# Patient Record
Sex: FEMALE | Race: WHITE | NOT HISPANIC OR LATINO | Employment: FULL TIME | URBAN - METROPOLITAN AREA
[De-identification: names, ages, dates, MRNs, and addresses within clinical notes are randomized per-mention and may not be internally consistent; named-entity substitution may affect disease eponyms.]

---

## 2021-11-22 ENCOUNTER — APPOINTMENT (OUTPATIENT)
Dept: GENERAL RADIOLOGY | Facility: HOSPITAL | Age: 26
End: 2021-11-22

## 2021-11-22 PROCEDURE — 73630 X-RAY EXAM OF FOOT: CPT | Performed by: PHYSICIAN ASSISTANT

## 2022-02-02 ENCOUNTER — INITIAL PRENATAL (OUTPATIENT)
Dept: OBSTETRICS AND GYNECOLOGY | Facility: CLINIC | Age: 27
End: 2022-02-02

## 2022-02-02 VITALS — DIASTOLIC BLOOD PRESSURE: 73 MMHG | WEIGHT: 169 LBS | SYSTOLIC BLOOD PRESSURE: 107 MMHG

## 2022-02-02 DIAGNOSIS — Z34.90 EARLY STAGE OF PREGNANCY: Primary | ICD-10-CM

## 2022-02-02 LAB
GLUCOSE UR STRIP-MCNC: NEGATIVE MG/DL
PROT UR STRIP-MCNC: NEGATIVE MG/DL

## 2022-02-02 PROCEDURE — 0501F PRENATAL FLOW SHEET: CPT | Performed by: OBSTETRICS & GYNECOLOGY

## 2022-02-02 RX ORDER — PRENATAL VIT/IRON FUM/FOLIC AC 27MG-0.8MG
TABLET ORAL DAILY
COMMUNITY
End: 2023-03-29

## 2022-02-02 NOTE — PROGRESS NOTES
Chief Complaint   Patient presents with   • Initial Prenatal Visit     HPI- Pt is 26 y.o.  at 8w0d here for prenatal visit.  Patient presents for initial OB visit.  She states she is sure regarding her LMP.  Her and her  were trying to conceive.  She has no major complaints today.  She is taking a prenatal vitamin.  She has no major underlying medical problems.  She does have a first-degree cousin with mosaicism Down syndrome.    ROS-     - No vaginal bleeding    GI- No abdominal pain    /73   Wt 76.7 kg (169 lb)   LMP 2021   Exam - See flow sheet    Fetal heart rate is normal    Assessment-  Diagnoses and all orders for this visit:    Early stage of pregnancy  -     OB Panel With HIV  -     Urine Culture - Urine, Urine, Clean Catch  -     IGP,CtNgTv,rfx Aptima HPV ASCU  -     US Ob Transvaginal; Future    Other orders  -     Prenatal Vit-Fe Fumarate-FA (prenatal vitamin 27-0.8) 27-0.8 MG tablet tablet; Take  by mouth Daily.  -     POC Urinalysis Dipstick    Initial OB counseling was done.  Discussed delivering hospital, weight gain, and diet in pregnancy.  OB labs and ultrasound have been ordered.  Discussed genetic screening options in pregnancy.  She will follow-up in 3 weeks.

## 2022-02-03 LAB
ABO GROUP BLD: NORMAL
BASOPHILS # BLD AUTO: 0.1 X10E3/UL (ref 0–0.2)
BASOPHILS NFR BLD AUTO: 1 %
BLD GP AB SCN SERPL QL: NEGATIVE
EOSINOPHIL # BLD AUTO: 0.3 X10E3/UL (ref 0–0.4)
EOSINOPHIL NFR BLD AUTO: 4 %
ERYTHROCYTE [DISTWIDTH] IN BLOOD BY AUTOMATED COUNT: 12.1 % (ref 11.7–15.4)
HBV SURFACE AG SERPL QL IA: NEGATIVE
HCT VFR BLD AUTO: 39.8 % (ref 34–46.6)
HCV AB S/CO SERPL IA: <0.1 S/CO RATIO (ref 0–0.9)
HGB BLD-MCNC: 13.2 G/DL (ref 11.1–15.9)
HIV 1+2 AB+HIV1 P24 AG SERPL QL IA: NON REACTIVE
IMM GRANULOCYTES # BLD AUTO: 0 X10E3/UL (ref 0–0.1)
IMM GRANULOCYTES NFR BLD AUTO: 0 %
LYMPHOCYTES # BLD AUTO: 2.5 X10E3/UL (ref 0.7–3.1)
LYMPHOCYTES NFR BLD AUTO: 38 %
MCH RBC QN AUTO: 29.2 PG (ref 26.6–33)
MCHC RBC AUTO-ENTMCNC: 33.2 G/DL (ref 31.5–35.7)
MCV RBC AUTO: 88 FL (ref 79–97)
MONOCYTES # BLD AUTO: 0.5 X10E3/UL (ref 0.1–0.9)
MONOCYTES NFR BLD AUTO: 7 %
NEUTROPHILS # BLD AUTO: 3.2 X10E3/UL (ref 1.4–7)
NEUTROPHILS NFR BLD AUTO: 50 %
PLATELET # BLD AUTO: 268 X10E3/UL (ref 150–450)
RBC # BLD AUTO: 4.52 X10E6/UL (ref 3.77–5.28)
RH BLD: POSITIVE
RPR SER QL: NON REACTIVE
RUBV IGG SERPL IA-ACNC: >33 INDEX
WBC # BLD AUTO: 6.6 X10E3/UL (ref 3.4–10.8)

## 2022-02-04 LAB
BACTERIA UR CULT: NO GROWTH
BACTERIA UR CULT: NORMAL

## 2022-02-07 LAB
C TRACH RRNA CVX QL NAA+PROBE: NEGATIVE
CONV .: NORMAL
CYTOLOGIST CVX/VAG CYTO: NORMAL
CYTOLOGY CVX/VAG DOC CYTO: NORMAL
CYTOLOGY CVX/VAG DOC THIN PREP: NORMAL
DX ICD CODE: NORMAL
HIV 1 & 2 AB SER-IMP: NORMAL
N GONORRHOEA RRNA CVX QL NAA+PROBE: NEGATIVE
OTHER STN SPEC: NORMAL
STAT OF ADQ CVX/VAG CYTO-IMP: NORMAL
T VAGINALIS RRNA SPEC QL NAA+PROBE: NEGATIVE

## 2022-02-25 ENCOUNTER — ROUTINE PRENATAL (OUTPATIENT)
Dept: OBSTETRICS AND GYNECOLOGY | Facility: CLINIC | Age: 27
End: 2022-02-25

## 2022-02-25 VITALS — WEIGHT: 172.8 LBS | DIASTOLIC BLOOD PRESSURE: 73 MMHG | SYSTOLIC BLOOD PRESSURE: 106 MMHG

## 2022-02-25 DIAGNOSIS — Z82.79 FAMILY HISTORY OF DOWN SYNDROME: ICD-10-CM

## 2022-02-25 DIAGNOSIS — Z34.01 ENCOUNTER FOR SUPERVISION OF NORMAL FIRST PREGNANCY IN FIRST TRIMESTER: Primary | ICD-10-CM

## 2022-02-25 LAB
GLUCOSE UR STRIP-MCNC: NEGATIVE MG/DL
PROT UR STRIP-MCNC: NEGATIVE MG/DL

## 2022-02-25 PROCEDURE — 0502F SUBSEQUENT PRENATAL CARE: CPT | Performed by: OBSTETRICS & GYNECOLOGY

## 2022-02-25 NOTE — PROGRESS NOTES
CC:  Pregnancy  She is doing well with no major issues.  Reviewed prenatal ultrasound and labs.  Patient does desire cell free DNA testing.  A/P: Supervision of pregnancy at 11 weeks  --Follow-up in 4 weeks

## 2022-03-10 LAB
CFDNA.FET/CFDNA.TOTAL SFR FETUS: NORMAL %
CITATION REF LAB TEST: NORMAL
FET 13+18+21+X+Y ANEUP PLAS.CFDNA: NEGATIVE
FET CHR 21 TS PLAS.CFDNA QL: NEGATIVE
FET SEX PLAS.CFDNA DOSAGE CFDNA: NORMAL
FET TS 13 RISK PLAS.CFDNA QL: NEGATIVE
FET TS 18 RISK WBC.DNA+CFDNA QL: NEGATIVE
GA EST FROM CONCEPTION DATE: NORMAL D
GESTATIONAL AGE > 9:: YES
LAB DIRECTOR NAME PROVIDER: NORMAL
LAB DIRECTOR NAME PROVIDER: NORMAL
LABORATORY COMMENT REPORT: NORMAL
LIMITATIONS OF THE TEST: NORMAL
NEGATIVE PREDICTIVE VALUE: NORMAL
NOTE: NORMAL
PERFORMANCE CHARACTERISTICS: NORMAL
POSITIVE PREDICTIVE VALUE: NORMAL
REF LAB TEST METHOD: NORMAL
TEST PERFORMANCE INFO SPEC: NORMAL

## 2022-03-11 ENCOUNTER — TELEPHONE (OUTPATIENT)
Dept: OBSTETRICS AND GYNECOLOGY | Facility: CLINIC | Age: 27
End: 2022-03-11

## 2022-03-11 NOTE — TELEPHONE ENCOUNTER
----- Message from Angelika Dick MD sent at 3/11/2022  8:54 AM EST -----  Please let patient know that her genetic testing was normal, and if she wants to know gender, it showed a male fetus

## 2022-03-25 ENCOUNTER — ROUTINE PRENATAL (OUTPATIENT)
Dept: OBSTETRICS AND GYNECOLOGY | Facility: CLINIC | Age: 27
End: 2022-03-25

## 2022-03-25 VITALS — WEIGHT: 171.8 LBS

## 2022-03-25 DIAGNOSIS — Z36.89 ENCOUNTER FOR FETAL ANATOMIC SURVEY: ICD-10-CM

## 2022-03-25 DIAGNOSIS — Z34.02 ENCOUNTER FOR SUPERVISION OF NORMAL FIRST PREGNANCY IN SECOND TRIMESTER: Primary | ICD-10-CM

## 2022-03-25 LAB
GLUCOSE UR STRIP-MCNC: NEGATIVE MG/DL
PROT UR STRIP-MCNC: NEGATIVE MG/DL

## 2022-03-25 PROCEDURE — 0502F SUBSEQUENT PRENATAL CARE: CPT | Performed by: OBSTETRICS & GYNECOLOGY

## 2022-03-25 NOTE — PROGRESS NOTES
CC:  Pregnancy  Patient doing well with no complaints.  She had normal cell free DNA testing.  Offered screening for neural tube defects and she declines.  She will followup in 4 weeks with anatomy u/s.  A/P:  Supervision of normal pregnancy at 15 weeks  --Followup in 4 weeks

## 2022-04-20 ENCOUNTER — ROUTINE PRENATAL (OUTPATIENT)
Dept: OBSTETRICS AND GYNECOLOGY | Facility: CLINIC | Age: 27
End: 2022-04-20

## 2022-04-20 VITALS — DIASTOLIC BLOOD PRESSURE: 77 MMHG | SYSTOLIC BLOOD PRESSURE: 116 MMHG | WEIGHT: 177.2 LBS

## 2022-04-20 DIAGNOSIS — Z34.02 ENCOUNTER FOR SUPERVISION OF NORMAL FIRST PREGNANCY IN SECOND TRIMESTER: Primary | ICD-10-CM

## 2022-04-20 LAB
GLUCOSE UR STRIP-MCNC: NEGATIVE MG/DL
PROT UR STRIP-MCNC: NEGATIVE MG/DL

## 2022-04-20 PROCEDURE — 0502F SUBSEQUENT PRENATAL CARE: CPT | Performed by: OBSTETRICS & GYNECOLOGY

## 2022-04-20 NOTE — PROGRESS NOTES
CC:  Pregnancy  She is doing well and has no complaints.  She has felt some fetal movement.  Anatomy ultrasound was performed today and shows normal-appearing fetal anatomy.  Ultrasound was reviewed with her.  A/P: Supervision of normal pregnancy at 19 weeks  -- Follow-up in 4 weeks

## 2022-05-18 ENCOUNTER — ROUTINE PRENATAL (OUTPATIENT)
Dept: OBSTETRICS AND GYNECOLOGY | Facility: CLINIC | Age: 27
End: 2022-05-18

## 2022-05-18 VITALS — WEIGHT: 180 LBS | SYSTOLIC BLOOD PRESSURE: 107 MMHG | DIASTOLIC BLOOD PRESSURE: 73 MMHG

## 2022-05-18 DIAGNOSIS — Z13.0 SCREENING FOR IRON DEFICIENCY ANEMIA: ICD-10-CM

## 2022-05-18 DIAGNOSIS — Z13.1 SCREENING FOR DIABETES MELLITUS: ICD-10-CM

## 2022-05-18 DIAGNOSIS — Z34.02 ENCOUNTER FOR SUPERVISION OF NORMAL FIRST PREGNANCY IN SECOND TRIMESTER: Primary | ICD-10-CM

## 2022-05-18 LAB
GLUCOSE UR STRIP-MCNC: NEGATIVE MG/DL
PROT UR STRIP-MCNC: NEGATIVE MG/DL

## 2022-05-18 PROCEDURE — 0502F SUBSEQUENT PRENATAL CARE: CPT | Performed by: OBSTETRICS & GYNECOLOGY

## 2022-05-18 NOTE — PROGRESS NOTES
CC:  Pregnancy  She is doing well and has no complaints.  She is feeling fetal movement.  Discussed 1 hour glucose test and CBC at her next visit.  A/P: Supervision of normal pregnancy at 23 weeks  -- Follow-up in 2 weeks

## 2022-06-15 ENCOUNTER — ROUTINE PRENATAL (OUTPATIENT)
Dept: OBSTETRICS AND GYNECOLOGY | Facility: CLINIC | Age: 27
End: 2022-06-15

## 2022-06-15 VITALS — DIASTOLIC BLOOD PRESSURE: 72 MMHG | WEIGHT: 185.8 LBS | SYSTOLIC BLOOD PRESSURE: 109 MMHG

## 2022-06-15 DIAGNOSIS — Z34.02 ENCOUNTER FOR SUPERVISION OF NORMAL FIRST PREGNANCY IN SECOND TRIMESTER: Primary | ICD-10-CM

## 2022-06-15 LAB
GLUCOSE UR STRIP-MCNC: NEGATIVE MG/DL
PROT UR STRIP-MCNC: NEGATIVE MG/DL

## 2022-06-15 PROCEDURE — 0502F SUBSEQUENT PRENATAL CARE: CPT | Performed by: OBSTETRICS & GYNECOLOGY

## 2022-06-15 NOTE — PROGRESS NOTES
CC:  Pregnancy  Patient has no complaints today.  She is doing well.  Discussed fetal kick counts in the last trimester.  She is doing her 1 hour glucose test today.  A/P: Supervision of normal pregnancy at 27 weeks  --Follow-up in 2 weeks

## 2022-06-16 LAB
ERYTHROCYTE [DISTWIDTH] IN BLOOD BY AUTOMATED COUNT: 12.3 % (ref 11.7–15.4)
GLUCOSE 1H P 50 G GLC PO SERPL-MCNC: 131 MG/DL (ref 65–139)
HCT VFR BLD AUTO: 38.4 % (ref 34–46.6)
HGB BLD-MCNC: 12.6 G/DL (ref 11.1–15.9)
MCH RBC QN AUTO: 29.6 PG (ref 26.6–33)
MCHC RBC AUTO-ENTMCNC: 32.8 G/DL (ref 31.5–35.7)
MCV RBC AUTO: 90 FL (ref 79–97)
PLATELET # BLD AUTO: 209 X10E3/UL (ref 150–450)
RBC # BLD AUTO: 4.25 X10E6/UL (ref 3.77–5.28)
WBC # BLD AUTO: 11.9 X10E3/UL (ref 3.4–10.8)

## 2022-07-01 ENCOUNTER — ROUTINE PRENATAL (OUTPATIENT)
Dept: OBSTETRICS AND GYNECOLOGY | Facility: CLINIC | Age: 27
End: 2022-07-01

## 2022-07-01 VITALS — SYSTOLIC BLOOD PRESSURE: 127 MMHG | DIASTOLIC BLOOD PRESSURE: 82 MMHG | WEIGHT: 189 LBS

## 2022-07-01 DIAGNOSIS — Z3A.29 29 WEEKS GESTATION OF PREGNANCY: ICD-10-CM

## 2022-07-01 DIAGNOSIS — Z23 NEED FOR TDAP VACCINATION: Primary | ICD-10-CM

## 2022-07-01 DIAGNOSIS — O99.210 MATERNAL OBESITY AFFECTING PREGNANCY, ANTEPARTUM: ICD-10-CM

## 2022-07-01 LAB
GLUCOSE UR STRIP-MCNC: NEGATIVE MG/DL
PROT UR STRIP-MCNC: NEGATIVE MG/DL

## 2022-07-01 PROCEDURE — 0502F SUBSEQUENT PRENATAL CARE: CPT | Performed by: OBSTETRICS & GYNECOLOGY

## 2022-07-01 PROCEDURE — 90471 IMMUNIZATION ADMIN: CPT | Performed by: OBSTETRICS & GYNECOLOGY

## 2022-07-01 PROCEDURE — 90715 TDAP VACCINE 7 YRS/> IM: CPT | Performed by: OBSTETRICS & GYNECOLOGY

## 2022-07-01 NOTE — PROGRESS NOTES
CC:  Pregnancy  She is doing well and has no major complaints.  She reports good fetal movement.  Recommend Tdap and she will receive the Tdap shot today.  We will check a growth ultrasound with her visit in 2 weeks.  A/P: Supervision of pregnancy at 29 weeks  -- Follow-up in 2 weeks

## 2022-07-13 ENCOUNTER — HOSPITAL ENCOUNTER (OUTPATIENT)
Dept: CARDIOLOGY | Facility: HOSPITAL | Age: 27
Discharge: HOME | End: 2022-07-13
Admitting: OBSTETRICS & GYNECOLOGY

## 2022-07-13 ENCOUNTER — ROUTINE PRENATAL (OUTPATIENT)
Dept: OBSTETRICS AND GYNECOLOGY | Facility: CLINIC | Age: 27
End: 2022-07-13

## 2022-07-13 VITALS — SYSTOLIC BLOOD PRESSURE: 118 MMHG | WEIGHT: 189 LBS | DIASTOLIC BLOOD PRESSURE: 75 MMHG

## 2022-07-13 DIAGNOSIS — O12.03 SWELLING OF LOWER EXTREMITY DURING PREGNANCY IN THIRD TRIMESTER: ICD-10-CM

## 2022-07-13 DIAGNOSIS — Z34.03 ENCOUNTER FOR SUPERVISION OF NORMAL FIRST PREGNANCY IN THIRD TRIMESTER: Primary | ICD-10-CM

## 2022-07-13 LAB
BH CV LOWER VASCULAR LEFT COMMON FEMORAL AUGMENT: NORMAL
BH CV LOWER VASCULAR LEFT COMMON FEMORAL COMPETENT: NORMAL
BH CV LOWER VASCULAR LEFT COMMON FEMORAL COMPRESS: NORMAL
BH CV LOWER VASCULAR LEFT COMMON FEMORAL PHASIC: NORMAL
BH CV LOWER VASCULAR LEFT COMMON FEMORAL SPONT: NORMAL
BH CV LOWER VASCULAR LEFT DISTAL FEMORAL COMPRESS: NORMAL
BH CV LOWER VASCULAR LEFT GASTRONEMIUS COMPRESS: NORMAL
BH CV LOWER VASCULAR LEFT GREATER SAPH AK COMPRESS: NORMAL
BH CV LOWER VASCULAR LEFT GREATER SAPH BK COMPRESS: NORMAL
BH CV LOWER VASCULAR LEFT LESSER SAPH COMPRESS: NORMAL
BH CV LOWER VASCULAR LEFT MID FEMORAL AUGMENT: NORMAL
BH CV LOWER VASCULAR LEFT MID FEMORAL COMPETENT: NORMAL
BH CV LOWER VASCULAR LEFT MID FEMORAL COMPRESS: NORMAL
BH CV LOWER VASCULAR LEFT MID FEMORAL PHASIC: NORMAL
BH CV LOWER VASCULAR LEFT MID FEMORAL SPONT: NORMAL
BH CV LOWER VASCULAR LEFT PERONEAL COMPRESS: NORMAL
BH CV LOWER VASCULAR LEFT POPLITEAL AUGMENT: NORMAL
BH CV LOWER VASCULAR LEFT POPLITEAL COMPETENT: NORMAL
BH CV LOWER VASCULAR LEFT POPLITEAL COMPRESS: NORMAL
BH CV LOWER VASCULAR LEFT POPLITEAL PHASIC: NORMAL
BH CV LOWER VASCULAR LEFT POPLITEAL SPONT: NORMAL
BH CV LOWER VASCULAR LEFT POSTERIOR TIBIAL COMPRESS: NORMAL
BH CV LOWER VASCULAR LEFT PROFUNDA FEMORAL COMPRESS: NORMAL
BH CV LOWER VASCULAR LEFT PROXIMAL FEMORAL COMPRESS: NORMAL
BH CV LOWER VASCULAR LEFT SAPHENOFEMORAL JUNCTION COMPRESS: NORMAL
BH CV LOWER VASCULAR RIGHT COMMON FEMORAL AUGMENT: NORMAL
BH CV LOWER VASCULAR RIGHT COMMON FEMORAL COMPETENT: NORMAL
BH CV LOWER VASCULAR RIGHT COMMON FEMORAL COMPRESS: NORMAL
BH CV LOWER VASCULAR RIGHT COMMON FEMORAL PHASIC: NORMAL
BH CV LOWER VASCULAR RIGHT COMMON FEMORAL SPONT: NORMAL
GLUCOSE UR STRIP-MCNC: NEGATIVE MG/DL
MAXIMAL PREDICTED HEART RATE: 192 BPM
PROT UR STRIP-MCNC: NEGATIVE MG/DL
STRESS TARGET HR: 163 BPM

## 2022-07-13 PROCEDURE — 0502F SUBSEQUENT PRENATAL CARE: CPT | Performed by: OBSTETRICS & GYNECOLOGY

## 2022-07-13 PROCEDURE — 93971 EXTREMITY STUDY: CPT

## 2022-07-13 NOTE — PROGRESS NOTES
Today's left lower venous doppler preliminary report is negative for deep vein thrombosis. This preliminary report was given to Dr. Angelika Dick. I was instructed to tell the patient the preliminary results and she can leave. Patient understands.

## 2022-07-13 NOTE — PROGRESS NOTES
CC:  Pregnancy  She complains today of an area on her left lower extremity on her shin that appeared as a bruise a couple of weeks ago and has slightly lightened in color.  There is a darkened area on the left shin that is slightly tender.  There is no overlying warmth and no significant surrounding edema.  I discussed with patient that I do recommend lower extremity Doppler to rule out DVT, but finding is most likely a small varicose vein.  Order was placed.  A growth ultrasound was performed today and showed appropriate fetal growth and ultrasound was reviewed with her.  A/P: Supervision of pregnancy at 31 weeks with left lower extremity swelling  -- Left lower extremity Doppler ordered  -- Follow-up with me in 2 weeks

## 2022-07-27 ENCOUNTER — ROUTINE PRENATAL (OUTPATIENT)
Dept: OBSTETRICS AND GYNECOLOGY | Facility: CLINIC | Age: 27
End: 2022-07-27

## 2022-07-27 VITALS — SYSTOLIC BLOOD PRESSURE: 116 MMHG | DIASTOLIC BLOOD PRESSURE: 75 MMHG | WEIGHT: 191.4 LBS

## 2022-07-27 DIAGNOSIS — Z34.03 ENCOUNTER FOR SUPERVISION OF NORMAL FIRST PREGNANCY IN THIRD TRIMESTER: Primary | ICD-10-CM

## 2022-07-27 PROCEDURE — 0502F SUBSEQUENT PRENATAL CARE: CPT | Performed by: OBSTETRICS & GYNECOLOGY

## 2022-07-27 NOTE — PROGRESS NOTES
CC: Pregnancy  She is doing well and has no major complaints.  She reports good fetal movement.  She was ruled out last week for DVT with lower extremity Doppler and denies any changes to the area.  A/P: Supervision of pregnancy at 33 weeks  -- Follow-up in 2 weeks

## 2022-08-10 ENCOUNTER — ROUTINE PRENATAL (OUTPATIENT)
Dept: OBSTETRICS AND GYNECOLOGY | Facility: CLINIC | Age: 27
End: 2022-08-10

## 2022-08-10 VITALS — SYSTOLIC BLOOD PRESSURE: 121 MMHG | WEIGHT: 194 LBS | DIASTOLIC BLOOD PRESSURE: 75 MMHG

## 2022-08-10 DIAGNOSIS — Z34.03 ENCOUNTER FOR SUPERVISION OF NORMAL FIRST PREGNANCY IN THIRD TRIMESTER: Primary | ICD-10-CM

## 2022-08-10 LAB
GLUCOSE UR STRIP-MCNC: NEGATIVE MG/DL
PROT UR STRIP-MCNC: NEGATIVE MG/DL

## 2022-08-10 PROCEDURE — 0502F SUBSEQUENT PRENATAL CARE: CPT | Performed by: OBSTETRICS & GYNECOLOGY

## 2022-08-10 NOTE — PROGRESS NOTES
CC:  Pregnancy  She continues to do well with no major issues.  She reports good fetal movement.  She is starting to become more uncomfortable.  Discussed cervical check and GBS culture at her next visit.  A/P: Supervision of normal pregnancy at 35 weeks  -- Follow-up in 1 week

## 2022-08-17 ENCOUNTER — ROUTINE PRENATAL (OUTPATIENT)
Dept: OBSTETRICS AND GYNECOLOGY | Facility: CLINIC | Age: 27
End: 2022-08-17

## 2022-08-17 VITALS — WEIGHT: 195.6 LBS | SYSTOLIC BLOOD PRESSURE: 115 MMHG | DIASTOLIC BLOOD PRESSURE: 76 MMHG

## 2022-08-17 DIAGNOSIS — Z36.85 ANTENATAL SCREENING FOR STREPTOCOCCUS B: ICD-10-CM

## 2022-08-17 DIAGNOSIS — Z34.03 ENCOUNTER FOR SUPERVISION OF NORMAL FIRST PREGNANCY IN THIRD TRIMESTER: Primary | ICD-10-CM

## 2022-08-17 LAB
GLUCOSE UR STRIP-MCNC: NEGATIVE MG/DL
PROT UR STRIP-MCNC: NEGATIVE MG/DL

## 2022-08-17 PROCEDURE — 0502F SUBSEQUENT PRENATAL CARE: CPT | Performed by: OBSTETRICS & GYNECOLOGY

## 2022-08-17 NOTE — PROGRESS NOTES
CC:  Pregnancy  She is doing well and has no major complaints.  She does report a history of rash with penicillins and we will plan on Ancef if GBS culture is positive.  She reports good fetal movement.  Labor precautions were discussed.  A/P: Supervision of normal pregnancy at 36 weeks  -- Follow-up weekly

## 2022-08-19 ENCOUNTER — TELEPHONE (OUTPATIENT)
Dept: OBSTETRICS AND GYNECOLOGY | Facility: CLINIC | Age: 27
End: 2022-08-19

## 2022-08-19 NOTE — TELEPHONE ENCOUNTER
Caller: JEANETTE    Relationship:     Best call back number: [phone]    Prescribing Provider: DR. NOEL    REP JEANETTE CALLED FROM [facility] TO CONFIRM IF DR. NOEL WILL BE THE SIGNING PHYSICIAN FOR BREAST PUMP ORDERED FOR PATIENT GAVIN LINDSEY.    JEANETTE WILL FAX ORDER [phone]    JEANETTE CALL BACK NUMBER [phone]

## 2022-08-21 LAB — B-HEM STREP SPEC QL CULT: NEGATIVE

## 2022-08-23 NOTE — TELEPHONE ENCOUNTER
Called [facility] regarding breast pump and they said it has shipped out. They received the fas last week and the device is now shipped.

## 2022-08-24 ENCOUNTER — ROUTINE PRENATAL (OUTPATIENT)
Dept: OBSTETRICS AND GYNECOLOGY | Facility: CLINIC | Age: 27
End: 2022-08-24

## 2022-08-24 VITALS — DIASTOLIC BLOOD PRESSURE: 76 MMHG | SYSTOLIC BLOOD PRESSURE: 116 MMHG | WEIGHT: 194 LBS

## 2022-08-24 DIAGNOSIS — O16.3 ELEVATED BLOOD PRESSURE AFFECTING PREGNANCY IN THIRD TRIMESTER, ANTEPARTUM: Primary | ICD-10-CM

## 2022-08-24 DIAGNOSIS — Z3A.37 37 WEEKS GESTATION OF PREGNANCY: ICD-10-CM

## 2022-08-24 LAB
GLUCOSE UR STRIP-MCNC: NEGATIVE MG/DL
PROT UR STRIP-MCNC: NEGATIVE MG/DL

## 2022-08-24 PROCEDURE — 0502F SUBSEQUENT PRENATAL CARE: CPT | Performed by: OBSTETRICS & GYNECOLOGY

## 2022-08-24 NOTE — PROGRESS NOTES
CC:  Pregnancy  Patient reports that she has taken her blood pressure several times at work with a wrist cuff and her blood pressures have been mildly elevated.  She also reports episodes of her heart racing that she notices on her apple watch.  She is able to exercise and states that she walked on the treadmill for 45 minutes this morning.  She has been off work today and states she is not had any episodes of feeling like her heart is racing.  She is always had normal blood pressures in our office and there is no protein in her urine today.  I offered to send her to labor and delivery for serial blood pressures and labs, but she declines.  We will check labs in our office today and she has been given a prescription for a regular blood pressure cuff.  I have advised her to call if her blood pressures are elevated with a normal blood pressure cuff.  A/P: Supervision of pregnancy at 37 weeks with patient reported hypertension, but normal blood pressures in the office  -- Follow-up in 1 week  -- Preeclampsia labs ordered

## 2022-08-26 LAB
ALBUMIN SERPL-MCNC: 3.5 G/DL (ref 3.9–5)
ALBUMIN/GLOB SERPL: 1.5 {RATIO} (ref 1.2–2.2)
ALP SERPL-CCNC: 154 IU/L (ref 44–121)
ALT SERPL-CCNC: 16 IU/L (ref 0–32)
AST SERPL-CCNC: 26 IU/L (ref 0–40)
BILIRUB SERPL-MCNC: 0.3 MG/DL (ref 0–1.2)
BUN SERPL-MCNC: 11 MG/DL (ref 6–20)
BUN/CREAT SERPL: 17 (ref 9–23)
CALCIUM SERPL-MCNC: 8.5 MG/DL (ref 8.7–10.2)
CHLORIDE SERPL-SCNC: 106 MMOL/L (ref 96–106)
CO2 SERPL-SCNC: 16 MMOL/L (ref 20–29)
CREAT SERPL-MCNC: 0.64 MG/DL (ref 0.57–1)
CREAT UR-MCNC: 84.1 MG/DL
EGFRCR-CYS SERPLBLD CKD-EPI 2021: 123 ML/MIN/1.73
ERYTHROCYTE [DISTWIDTH] IN BLOOD BY AUTOMATED COUNT: 12.3 % (ref 11.7–15.4)
GLOBULIN SER CALC-MCNC: 2.4 G/DL (ref 1.5–4.5)
GLUCOSE SERPL-MCNC: 77 MG/DL (ref 65–99)
HCT VFR BLD AUTO: 42.8 % (ref 34–46.6)
HGB BLD-MCNC: 13.8 G/DL (ref 11.1–15.9)
MCH RBC QN AUTO: 30.2 PG (ref 26.6–33)
MCHC RBC AUTO-ENTMCNC: 32.2 G/DL (ref 31.5–35.7)
MCV RBC AUTO: 94 FL (ref 79–97)
PLATELET # BLD AUTO: 182 X10E3/UL (ref 150–450)
POTASSIUM SERPL-SCNC: 4.1 MMOL/L (ref 3.5–5.2)
PROT SERPL-MCNC: 5.9 G/DL (ref 6–8.5)
PROT UR-MCNC: 9.7 MG/DL
PROT/CREAT UR: 115 MG/G CREAT (ref 0–200)
RBC # BLD AUTO: 4.57 X10E6/UL (ref 3.77–5.28)
SODIUM SERPL-SCNC: 137 MMOL/L (ref 134–144)
WBC # BLD AUTO: 11.7 X10E3/UL (ref 3.4–10.8)

## 2022-08-31 ENCOUNTER — ROUTINE PRENATAL (OUTPATIENT)
Dept: OBSTETRICS AND GYNECOLOGY | Facility: CLINIC | Age: 27
End: 2022-08-31

## 2022-08-31 VITALS — WEIGHT: 197 LBS | DIASTOLIC BLOOD PRESSURE: 83 MMHG | SYSTOLIC BLOOD PRESSURE: 115 MMHG

## 2022-08-31 DIAGNOSIS — Z34.03 ENCOUNTER FOR SUPERVISION OF NORMAL FIRST PREGNANCY IN THIRD TRIMESTER: Primary | ICD-10-CM

## 2022-08-31 PROCEDURE — 0502F SUBSEQUENT PRENATAL CARE: CPT | Performed by: OBSTETRICS & GYNECOLOGY

## 2022-08-31 NOTE — PROGRESS NOTES
CC:  Pregnancy  She is doing well and has no major complaints today.  She states her blood pressures have been normal after using an appropriate blood pressure cuff.  She reports good fetal movement.  She denies any contractions.  She was given the option of elective induction at 39 weeks, but at this time wishes for expectant management.  A/P: Supervision of normal pregnancy at 38 weeks  -- Follow-up in 1 week

## 2022-09-07 ENCOUNTER — ROUTINE PRENATAL (OUTPATIENT)
Dept: OBSTETRICS AND GYNECOLOGY | Facility: CLINIC | Age: 27
End: 2022-09-07

## 2022-09-07 VITALS — WEIGHT: 200.2 LBS | SYSTOLIC BLOOD PRESSURE: 113 MMHG | DIASTOLIC BLOOD PRESSURE: 71 MMHG

## 2022-09-07 DIAGNOSIS — O48.0 POST-TERM PREGNANCY, 40-42 WEEKS OF GESTATION: ICD-10-CM

## 2022-09-07 DIAGNOSIS — Z34.03 ENCOUNTER FOR SUPERVISION OF NORMAL FIRST PREGNANCY IN THIRD TRIMESTER: Primary | ICD-10-CM

## 2022-09-07 LAB
GLUCOSE UR STRIP-MCNC: NEGATIVE MG/DL
PROT UR STRIP-MCNC: NEGATIVE MG/DL

## 2022-09-07 PROCEDURE — 0502F SUBSEQUENT PRENATAL CARE: CPT | Performed by: OBSTETRICS & GYNECOLOGY

## 2022-09-07 NOTE — PROGRESS NOTES
CC:  Pregnancy  She is doing well and has no major complaints.  She reports good fetal movement.  She would like to continue with expectant management at this time and we will check an ultrasound next week with her appointments and she will be 40 weeks.  A/P: Supervision of normal pregnancy at 39 weeks  -- Follow-up in 1 week with growth ultrasound and BPP

## 2022-09-14 ENCOUNTER — ROUTINE PRENATAL (OUTPATIENT)
Dept: OBSTETRICS AND GYNECOLOGY | Facility: CLINIC | Age: 27
End: 2022-09-14

## 2022-09-14 VITALS — SYSTOLIC BLOOD PRESSURE: 123 MMHG | WEIGHT: 200 LBS | DIASTOLIC BLOOD PRESSURE: 80 MMHG

## 2022-09-14 DIAGNOSIS — Z3A.40 40 WEEKS GESTATION OF PREGNANCY: ICD-10-CM

## 2022-09-14 DIAGNOSIS — O48.0 POST-TERM PREGNANCY, 40-42 WEEKS OF GESTATION: Primary | ICD-10-CM

## 2022-09-14 LAB
GLUCOSE UR STRIP-MCNC: NEGATIVE MG/DL
PROT UR STRIP-MCNC: NEGATIVE MG/DL

## 2022-09-14 PROCEDURE — 0502F SUBSEQUENT PRENATAL CARE: CPT | Performed by: OBSTETRICS & GYNECOLOGY

## 2022-09-14 NOTE — PROGRESS NOTES
CC:  Pregnancy  She continues to do well and denies any contractions, leaking, or bleeding.  She reports good fetal movement.  Ultrasound today shows appropriate fetal growth and BPP is 8 out of 8.  She would like to continue with expectant management for 1 more week to see if labor will occur on its own.  She will see me back next Tuesday with another BPP and if undelivered, we will schedule her for induction next Friday.    A/P: Supervision of pregnancy at 40 weeks  -- Follow-up in 1 week with BPP

## 2022-09-16 ENCOUNTER — HOSPITAL ENCOUNTER (INPATIENT)
Facility: HOSPITAL | Age: 27
LOS: 2 days | Discharge: HOME | End: 2022-09-18
Attending: OBSTETRICS & GYNECOLOGY | Admitting: OBSTETRICS & GYNECOLOGY

## 2022-09-16 ENCOUNTER — ANESTHESIA (OUTPATIENT)
Dept: LABOR AND DELIVERY | Facility: HOSPITAL | Age: 27
End: 2022-09-16

## 2022-09-16 ENCOUNTER — ANESTHESIA EVENT (OUTPATIENT)
Dept: LABOR AND DELIVERY | Facility: HOSPITAL | Age: 27
End: 2022-09-16

## 2022-09-16 PROBLEM — Z34.90 PREGNANCY: Status: ACTIVE | Noted: 2022-09-16

## 2022-09-16 LAB
ABO GROUP BLD: NORMAL
BLD GP AB SCN SERPL QL: NEGATIVE
DEPRECATED RDW RBC AUTO: 41.2 FL (ref 37–54)
ERYTHROCYTE [DISTWIDTH] IN BLOOD BY AUTOMATED COUNT: 12.5 % (ref 12.3–15.4)
HCT VFR BLD AUTO: 42 % (ref 34–46.6)
HGB BLD-MCNC: 14 G/DL (ref 12–15.9)
MCH RBC QN AUTO: 30 PG (ref 26.6–33)
MCHC RBC AUTO-ENTMCNC: 33.3 G/DL (ref 31.5–35.7)
MCV RBC AUTO: 90.1 FL (ref 79–97)
PLATELET # BLD AUTO: 148 10*3/MM3 (ref 140–450)
PMV BLD AUTO: 11.8 FL (ref 6–12)
RBC # BLD AUTO: 4.66 10*6/MM3 (ref 3.77–5.28)
RH BLD: POSITIVE
T&S EXPIRATION DATE: NORMAL
WBC NRBC COR # BLD: 10.9 10*3/MM3 (ref 3.4–10.8)

## 2022-09-16 PROCEDURE — C1755 CATHETER, INTRASPINAL: HCPCS

## 2022-09-16 PROCEDURE — 59400 OBSTETRICAL CARE: CPT | Performed by: OBSTETRICS & GYNECOLOGY

## 2022-09-16 PROCEDURE — 99202 OFFICE O/P NEW SF 15 MIN: CPT | Performed by: OBSTETRICS & GYNECOLOGY

## 2022-09-16 PROCEDURE — 25010000002 ONDANSETRON PER 1 MG: Performed by: ANESTHESIOLOGY

## 2022-09-16 PROCEDURE — S0260 H&P FOR SURGERY: HCPCS | Performed by: OBSTETRICS & GYNECOLOGY

## 2022-09-16 PROCEDURE — 0KQM0ZZ REPAIR PERINEUM MUSCLE, OPEN APPROACH: ICD-10-PCS | Performed by: OBSTETRICS & GYNECOLOGY

## 2022-09-16 PROCEDURE — C1755 CATHETER, INTRASPINAL: HCPCS | Performed by: ANESTHESIOLOGY

## 2022-09-16 PROCEDURE — 25010000002 OXYTOCIN PER 10 UNITS: Performed by: OBSTETRICS & GYNECOLOGY

## 2022-09-16 PROCEDURE — 86900 BLOOD TYPING SEROLOGIC ABO: CPT | Performed by: OBSTETRICS & GYNECOLOGY

## 2022-09-16 PROCEDURE — 85027 COMPLETE CBC AUTOMATED: CPT | Performed by: OBSTETRICS & GYNECOLOGY

## 2022-09-16 PROCEDURE — 86901 BLOOD TYPING SEROLOGIC RH(D): CPT | Performed by: OBSTETRICS & GYNECOLOGY

## 2022-09-16 PROCEDURE — 86850 RBC ANTIBODY SCREEN: CPT | Performed by: OBSTETRICS & GYNECOLOGY

## 2022-09-16 RX ORDER — FENTANYL CIT 0.2 MG/100ML-ROPIV 0.2%-NACL 0.9% EPIDURAL INJ 2/0.2 MCG/ML-%
10 SOLUTION INJECTION CONTINUOUS
Status: DISCONTINUED | OUTPATIENT
Start: 2022-09-16 | End: 2022-09-17

## 2022-09-16 RX ORDER — SODIUM CHLORIDE, SODIUM LACTATE, POTASSIUM CHLORIDE, CALCIUM CHLORIDE 600; 310; 30; 20 MG/100ML; MG/100ML; MG/100ML; MG/100ML
125 INJECTION, SOLUTION INTRAVENOUS CONTINUOUS
Status: DISCONTINUED | OUTPATIENT
Start: 2022-09-16 | End: 2022-09-17

## 2022-09-16 RX ORDER — ONDANSETRON 4 MG/1
4 TABLET, FILM COATED ORAL EVERY 8 HOURS PRN
Status: DISCONTINUED | OUTPATIENT
Start: 2022-09-16 | End: 2022-09-18 | Stop reason: HOSPADM

## 2022-09-16 RX ORDER — MISOPROSTOL 200 UG/1
800 TABLET ORAL ONCE AS NEEDED
Status: DISCONTINUED | OUTPATIENT
Start: 2022-09-16 | End: 2022-09-16 | Stop reason: HOSPADM

## 2022-09-16 RX ORDER — MAGNESIUM CARB/ALUMINUM HYDROX 105-160MG
30 TABLET,CHEWABLE ORAL ONCE
Status: DISCONTINUED | OUTPATIENT
Start: 2022-09-16 | End: 2022-09-16 | Stop reason: HOSPADM

## 2022-09-16 RX ORDER — METHYLERGONOVINE MALEATE 0.2 MG/ML
200 INJECTION INTRAVENOUS ONCE AS NEEDED
Status: DISCONTINUED | OUTPATIENT
Start: 2022-09-16 | End: 2022-09-16 | Stop reason: HOSPADM

## 2022-09-16 RX ORDER — ONDANSETRON 2 MG/ML
4 INJECTION INTRAMUSCULAR; INTRAVENOUS EVERY 6 HOURS PRN
Status: DISCONTINUED | OUTPATIENT
Start: 2022-09-16 | End: 2022-09-16 | Stop reason: HOSPADM

## 2022-09-16 RX ORDER — LIDOCAINE HYDROCHLORIDE 10 MG/ML
5 INJECTION, SOLUTION EPIDURAL; INFILTRATION; INTRACAUDAL; PERINEURAL AS NEEDED
Status: DISCONTINUED | OUTPATIENT
Start: 2022-09-16 | End: 2022-09-16 | Stop reason: HOSPADM

## 2022-09-16 RX ORDER — HYDROCODONE BITARTRATE AND ACETAMINOPHEN 5; 325 MG/1; MG/1
1 TABLET ORAL EVERY 4 HOURS PRN
Status: DISCONTINUED | OUTPATIENT
Start: 2022-09-16 | End: 2022-09-18 | Stop reason: HOSPADM

## 2022-09-16 RX ORDER — CARBOPROST TROMETHAMINE 250 UG/ML
250 INJECTION, SOLUTION INTRAMUSCULAR
Status: DISCONTINUED | OUTPATIENT
Start: 2022-09-16 | End: 2022-09-16 | Stop reason: HOSPADM

## 2022-09-16 RX ORDER — EPHEDRINE SULFATE 50 MG/ML
5 INJECTION, SOLUTION INTRAVENOUS AS NEEDED
Status: DISCONTINUED | OUTPATIENT
Start: 2022-09-16 | End: 2022-09-16 | Stop reason: HOSPADM

## 2022-09-16 RX ORDER — FAMOTIDINE 10 MG/ML
20 INJECTION, SOLUTION INTRAVENOUS ONCE AS NEEDED
Status: DISCONTINUED | OUTPATIENT
Start: 2022-09-16 | End: 2022-09-16 | Stop reason: HOSPADM

## 2022-09-16 RX ORDER — IBUPROFEN 600 MG/1
600 TABLET ORAL EVERY 8 HOURS PRN
Status: DISCONTINUED | OUTPATIENT
Start: 2022-09-16 | End: 2022-09-18 | Stop reason: HOSPADM

## 2022-09-16 RX ORDER — ONDANSETRON 2 MG/ML
4 INJECTION INTRAMUSCULAR; INTRAVENOUS ONCE AS NEEDED
Status: COMPLETED | OUTPATIENT
Start: 2022-09-16 | End: 2022-09-16

## 2022-09-16 RX ORDER — SODIUM CHLORIDE, SODIUM LACTATE, POTASSIUM CHLORIDE, CALCIUM CHLORIDE 600; 310; 30; 20 MG/100ML; MG/100ML; MG/100ML; MG/100ML
125 INJECTION, SOLUTION INTRAVENOUS CONTINUOUS
Status: DISCONTINUED | OUTPATIENT
Start: 2022-09-16 | End: 2022-09-16 | Stop reason: SDUPTHER

## 2022-09-16 RX ORDER — LIDOCAINE HYDROCHLORIDE AND EPINEPHRINE 15; 5 MG/ML; UG/ML
INJECTION, SOLUTION EPIDURAL AS NEEDED
Status: DISCONTINUED | OUTPATIENT
Start: 2022-09-16 | End: 2022-09-16 | Stop reason: SURG

## 2022-09-16 RX ORDER — OXYTOCIN-SODIUM CHLORIDE 0.9% IV SOLN 30 UNIT/500ML 30-0.9/5 UT/ML-%
250 SOLUTION INTRAVENOUS CONTINUOUS PRN
Status: ACTIVE | OUTPATIENT
Start: 2022-09-16 | End: 2022-09-16

## 2022-09-16 RX ORDER — ERYTHROMYCIN 5 MG/G
OINTMENT OPHTHALMIC
Status: ACTIVE
Start: 2022-09-16 | End: 2022-09-17

## 2022-09-16 RX ORDER — PHYTONADIONE 1 MG/.5ML
INJECTION, EMULSION INTRAMUSCULAR; INTRAVENOUS; SUBCUTANEOUS
Status: ACTIVE
Start: 2022-09-16 | End: 2022-09-17

## 2022-09-16 RX ORDER — SODIUM CHLORIDE 0.9 % (FLUSH) 0.9 %
10 SYRINGE (ML) INJECTION EVERY 12 HOURS SCHEDULED
Status: DISCONTINUED | OUTPATIENT
Start: 2022-09-16 | End: 2022-09-16 | Stop reason: HOSPADM

## 2022-09-16 RX ORDER — FAMOTIDINE 20 MG/1
20 TABLET, FILM COATED ORAL ONCE AS NEEDED
Status: DISCONTINUED | OUTPATIENT
Start: 2022-09-16 | End: 2022-09-16 | Stop reason: HOSPADM

## 2022-09-16 RX ORDER — OXYTOCIN-SODIUM CHLORIDE 0.9% IV SOLN 30 UNIT/500ML 30-0.9/5 UT/ML-%
999 SOLUTION INTRAVENOUS ONCE
Status: COMPLETED | OUTPATIENT
Start: 2022-09-16 | End: 2022-09-16

## 2022-09-16 RX ORDER — ACETAMINOPHEN 325 MG/1
650 TABLET ORAL EVERY 4 HOURS PRN
Status: DISCONTINUED | OUTPATIENT
Start: 2022-09-16 | End: 2022-09-16 | Stop reason: HOSPADM

## 2022-09-16 RX ORDER — HYDROCODONE BITARTRATE AND ACETAMINOPHEN 10; 325 MG/1; MG/1
1 TABLET ORAL EVERY 4 HOURS PRN
Status: DISCONTINUED | OUTPATIENT
Start: 2022-09-16 | End: 2022-09-18 | Stop reason: HOSPADM

## 2022-09-16 RX ORDER — OXYTOCIN-SODIUM CHLORIDE 0.9% IV SOLN 30 UNIT/500ML 30-0.9/5 UT/ML-%
2-20 SOLUTION INTRAVENOUS
Status: DISCONTINUED | OUTPATIENT
Start: 2022-09-16 | End: 2022-09-17

## 2022-09-16 RX ORDER — SODIUM CHLORIDE 0.9 % (FLUSH) 0.9 %
10 SYRINGE (ML) INJECTION AS NEEDED
Status: DISCONTINUED | OUTPATIENT
Start: 2022-09-16 | End: 2022-09-16 | Stop reason: HOSPADM

## 2022-09-16 RX ORDER — ONDANSETRON 4 MG/1
4 TABLET, FILM COATED ORAL EVERY 6 HOURS PRN
Status: DISCONTINUED | OUTPATIENT
Start: 2022-09-16 | End: 2022-09-16 | Stop reason: HOSPADM

## 2022-09-16 RX ORDER — OXYTOCIN-SODIUM CHLORIDE 0.9% IV SOLN 30 UNIT/500ML 30-0.9/5 UT/ML-%
125 SOLUTION INTRAVENOUS CONTINUOUS PRN
Status: COMPLETED | OUTPATIENT
Start: 2022-09-16 | End: 2022-09-16

## 2022-09-16 RX ORDER — DIPHENHYDRAMINE HYDROCHLORIDE 50 MG/ML
12.5 INJECTION INTRAMUSCULAR; INTRAVENOUS EVERY 8 HOURS PRN
Status: DISCONTINUED | OUTPATIENT
Start: 2022-09-16 | End: 2022-09-16 | Stop reason: HOSPADM

## 2022-09-16 RX ADMIN — OXYTOCIN 999 ML/HR: 10 INJECTION INTRAVENOUS at 21:34

## 2022-09-16 RX ADMIN — SODIUM CHLORIDE, POTASSIUM CHLORIDE, SODIUM LACTATE AND CALCIUM CHLORIDE 125 ML/HR: 600; 310; 30; 20 INJECTION, SOLUTION INTRAVENOUS at 13:07

## 2022-09-16 RX ADMIN — ACETAMINOPHEN 650 MG: 325 TABLET, FILM COATED ORAL at 19:20

## 2022-09-16 RX ADMIN — LIDOCAINE HYDROCHLORIDE AND EPINEPHRINE 3.5 ML: 15; 5 INJECTION, SOLUTION EPIDURAL at 16:30

## 2022-09-16 RX ADMIN — IBUPROFEN 600 MG: 600 TABLET ORAL at 23:12

## 2022-09-16 RX ADMIN — ONDANSETRON 4 MG: 2 INJECTION INTRAMUSCULAR; INTRAVENOUS at 19:54

## 2022-09-16 RX ADMIN — OXYTOCIN 125 ML/HR: 10 INJECTION INTRAVENOUS at 22:50

## 2022-09-16 RX ADMIN — Medication 10 ML/HR: at 16:33

## 2022-09-16 RX ADMIN — OXYTOCIN 2 MILLI-UNITS/MIN: 10 INJECTION INTRAVENOUS at 10:42

## 2022-09-16 RX ADMIN — SODIUM CHLORIDE, POTASSIUM CHLORIDE, SODIUM LACTATE AND CALCIUM CHLORIDE 125 ML/HR: 600; 310; 30; 20 INJECTION, SOLUTION INTRAVENOUS at 05:12

## 2022-09-16 NOTE — ANESTHESIA PROCEDURE NOTES
Labor Epidural      Patient reassessed immediately prior to procedure    Patient location during procedure: OB  Start Time: 9/16/2022 4:21 PM  Stop Time: 9/16/2022 4:28 PM  Performed By  Anesthesiologist: Albertina Kingston MD  Preanesthetic Checklist  Completed: patient identified, IV checked, site marked, risks and benefits discussed, surgical consent, monitors and equipment checked, pre-op evaluation and timeout performed  Prep:  Pt Position:sitting  Sterile Tech:cap, gloves, gown, mask and sterile barrier  Prep:chlorhexidine gluconate and isopropyl alcohol  Monitoring:blood pressure monitoring, continuous pulse oximetry and EKG  Epidural Block Procedure:  Approach:midline  Guidance:landmark technique and palpation technique  Location:L3-L4  Needle Type:Tuohy  Needle Gauge:17 G  Loss of Resistance Medium: saline  Loss of Resistance: 5cm  Cath Depth at skin:9 cm  Paresthesia: none  Aspiration:negative  Test Dose:negative  Number of Attempts: 1  Post Assessment:  Dressing:occlusive dressing applied and secured with tape  Pt Tolerance:patient tolerated the procedure well with no apparent complications  Complications:no

## 2022-09-16 NOTE — H&P
H&P Note    Patient Identification:  Name: Jannette Moy  Age: 27 y.o.  Sex: female  :  1995  MRN: [id number]                       Chief Complaint: Rupture of membrane    History of Present Illness:   27-year-old  1 para 0 presented early this morning with spontaneous rupture of membranes.  She experienced a gush of clear fluid at home.  She presented to OB ED where rupture of membranes was confirmed.  The patient has only been feeling rare contractions.  Initially, she was observed at her request to determine if labor would start spontaneously.  It did not.  Earlier this morning, the patient elected to proceed with augmentation using Pitocin.      Currently, the patient is comfortable and feeling only rare contractions.    [facility]:  [facility]  Past Medical History:  History reviewed. No pertinent past medical history.  Past Surgical History:  Past Surgical History:   Procedure Laterality Date   • TONSILLECTOMY     • WISDOM TOOTH EXTRACTION        Home Meds:  Medications Prior to Admission   Medication Sig Dispense Refill Last Dose   • Prenatal Vit-Fe Fumarate-FA (prenatal vitamin 27-0.8) 27-0.8 MG tablet tablet Take  by mouth Daily.   9/15/2022 at 2200     Current Meds:   [unfilled]  Allergies:  Allergies   Allergen Reactions   • Penicillins Rash   • Raspberry Rash     Immunizations:  Immunization History   Administered Date(s) Administered   • COVID-19 (PFIZER) PURPLE CAP 2020, 2021, 2022   • Tdap 2022     Social History:   Social History     Tobacco Use   • Smoking status: Never Smoker   • Smokeless tobacco: Never Used   Substance Use Topics   • Alcohol use: Never      Family History:  History reviewed. No pertinent family history.     Review of Systems  A comprehensive review of systems was negative.    Objective:  tMax 24 hrs: Temp (24hrs), Av.1 °F (36.7 °C), Min:97.9 °F (36.6 °C), Max:98.3 °F (36.8 °C)    Vitals Ranges:   Temp:  [97.9 °F (36.6 °C)-98.3 °F (36.8 °C)]  98.2 °F (36.8 °C)  Heart Rate:  [] 89  Resp:  [18] 18  BP: ()/(53-78) 111/69  Intake and Output Last 3 Shifts:   I/O last 3 completed shifts:  In: 162 [I.V.:162]  Out: 400 [Urine:400]    Exam:     General Appearance:    Alert, cooperative, no distress, appears stated age   Head:    Normocephalic, without obvious abnormality, atraumatic   Back:     Symmetric, no curvature, ROM normal, no CVA tenderness   Lungs:     Clear to auscultation bilaterally, respirations unlabored   Chest Wall:    No tenderness or deformity    Heart:    Regular rate and rhythm, S1 and S2 normal, no murmur, rub   or gallop       Abdomen:    Soft, gravid.  There is no epigastric tenderness.  No fundal tenderness.  No CVA tenderness.   Genitalia:   The cervix is posterior, 50% effaced and 1 cm dilated.  A 4 bag is present.  With the patient's permission, amniotomy of the fore bag was performed.  Clear fluid returned.   Rectal:   Not examined   Extremities:   Extremities normal, atraumatic, no cyanosis or edema   Skin:   Skin color, texture, turgor normal, no rashes or lesions   Lymph nodes:   Cervical, supraclavicular, and axillary nodes normal       Data Review:    Lab Results (last 24 hours)     Procedure Component Value Units Date/Time    CBC (No Diff) [[id number]]  (Abnormal) Collected: 09/16/22 0532    Specimen: Blood Updated: 09/16/22 0624     WBC 10.90 10*3/mm3      RBC 4.66 10*6/mm3      Hemoglobin 14.0 g/dL      Hematocrit 42.0 %      MCV 90.1 fL      MCH 30.0 pg      MCHC 33.3 g/dL      RDW 12.5 %      RDW-SD 41.2 fl      MPV 11.8 fL      Platelets 148 10*3/mm3         Assessment:    Pregnancy      1.  Intrauterine pregnancy at 40-2/7 weeks  2.  PROM without labor.  Counseled and questions answered.  forebag was ruptured.  Labor augmentation with Pitocin has begun.  The patient is tolerating this well.  Fetal heart tones are reactive.  Group B strep status is negative.    Plan:  Augmentation of labor.  Anticipate a vaginal  delivery    Fidel Flannery MD  9/16/2022

## 2022-09-16 NOTE — PLAN OF CARE
"  Problem: Adult Inpatient Plan of Care  Goal: Plan of Care Review  9/16/2022 0625 by Alize Colvin RN  Outcome: Ongoing, Progressing  Flowsheets (Taken 9/16/2022 0625)  Progress: improving  Plan of Care Reviewed With: patient  Outcome Evaluation: Pt presented to [facility] at 0348 with reporting \"gush of fluid\" at 0200. Pt reports moderate amount of clear amniotic fluid. SVE performed. Dr. Cobb notified Dr. Cline of pt arrival. Dr. Cline request to hold on Pitocin until Dr. Flannery can assess in the AM. LR running at 125 mL/hr. Pt rating ctx 0 out of 10 at this time. Pt wishes to breastfeed. Pt wants an Epidural. FOB at bedside and supportive of pt.  9/16/2022 0625 by Alize Colvin, KHALIDA  Outcome: Ongoing, Progressing  Flowsheets (Taken 9/16/2022 0625)  Progress: improving  Plan of Care Reviewed With: patient  Outcome Evaluation: Pt presented to [facility] at 0348 with reporting \"gush of fluid\" at 0200. Pt reports moderate amount of clear amniotic fluid. SVE performed. Dr. Cobb notified Dr. Cline of pt arrival. Dr. Cline request to hold on Pitocin until Dr. Flannery can assess in the AM. LR running at 125 mL/hr. Pt rating ctx 0 out of 10 at this time. Pt wishes to breastfeed. Pt wants an Epidural. FOB at bedside and supportive of pt.  Goal: Patient-Specific Goal (Individualized)  9/16/2022 0625 by Alize Colvin RN  Outcome: Ongoing, Progressing  Flowsheets (Taken 9/16/2022 0625)  Patient-Specific Goals (Include Timeframe): healthy mom and baby by discharge  Individualized Care Needs:   BRADY   breastfeeding   pain control  Anxieties, Fears or Concerns: first labor/ delivery process  9/16/2022 0625 by Alize Colvin RN  Outcome: Ongoing, Progressing  Flowsheets (Taken 9/16/2022 0625)  Patient-Specific Goals (Include Timeframe): healthy mom and baby by discharge  Individualized Care Needs:   BRADY   breastfeeding   pain control  Anxieties, Fears or Concerns: first labor/ delivery process  Goal: Absence of " Hospital-Acquired Illness or Injury  9/16/2022 0625 by Alize Colvin RN  Outcome: Ongoing, Progressing  9/16/2022 0625 by Alize Colvin RN  Outcome: Ongoing, Progressing  Intervention: Identify and Manage Fall Risk  Recent Flowsheet Documentation  Taken 9/16/2022 0434 by Alize Colvin RN  Safety Promotion/Fall Prevention: safety round/check completed  Goal: Optimal Comfort and Wellbeing  9/16/2022 0625 by Alize Colvin RN  Outcome: Ongoing, Progressing  9/16/2022 0625 by Alize Colvin RN  Outcome: Ongoing, Progressing  Intervention: Provide Person-Centered Care  Recent Flowsheet Documentation  Taken 9/16/2022 0434 by Alize Colvin RN  Trust Relationship/Rapport:   questions answered   questions encouraged   emotional support provided   empathic listening provided  Goal: Readiness for Transition of Care  9/16/2022 0625 by Alize Colvin RN  Outcome: Ongoing, Progressing  9/16/2022 0625 by Alize Colvin RN  Outcome: Ongoing, Progressing  Intervention: Mutually Develop Transition Plan  Recent Flowsheet Documentation  Taken 9/16/2022 0435 by Alize Colvin RN  Equipment Currently Used at Home: none  Taken 9/16/2022 0431 by Alize Colvin RN  Transportation Anticipated: car, drives self  Patient/Family Anticipated Services at Transition: none  Patient/Family Anticipates Transition to: home with family     Problem: Bleeding (Labor)  Goal: Hemostasis  Outcome: Ongoing, Progressing     Problem: Change in Fetal Wellbeing (Labor)  Goal: Stable Fetal Wellbeing  Outcome: Ongoing, Progressing     Problem: Delayed Labor Progression (Labor)  Goal: Effective Progression to Delivery  Outcome: Ongoing, Progressing     Problem: Infection (Labor)  Goal: Absence of Infection Signs and Symptoms  Outcome: Ongoing, Progressing     Problem: Labor Pain (Labor)  Goal: Acceptable Pain Control  Outcome: Ongoing, Progressing     Problem: Uterine Tachysystole (Labor)  Goal: Normal Uterine Contraction Pattern  Outcome:  "Ongoing, Progressing     Problem: Pain Acute  Goal: Acceptable Pain Control and Functional Ability  Outcome: Ongoing, Progressing   Goal Outcome Evaluation:  Plan of Care Reviewed With: patient        Progress: improving  Outcome Evaluation: Pt presented to [facility] at 0348 with reporting \"gush of fluid\" at 0200. Pt reports moderate amount of clear amniotic fluid. SVE performed. Dr. Cobb notified Dr. Cline of pt arrival. Dr. Cline request to hold on Pitocin until Dr. Flannery can assess in the AM. LR running at 125 mL/hr. Pt rating ctx 0 out of 10 at this time. Pt wishes to breastfeed. Pt wants an Epidural. FOB at bedside and supportive of pt.  "

## 2022-09-16 NOTE — OBED NOTES
"REMI Note [facility]    Patient Name: Jannette Moy  YOB: 1995  MRN: [id number]  Admission Date: 2022  3:48 AM  Date of Service: 2022    Chief Complaint: Rupture of Membranes (Pt states \"gush of fluid\" at 0200 on . Pt states active fetal movement. Pt denies vaginal bleeding. Pt denies contractions )        Subjective     Jannette Moy is a 27 y.o. female  at 40w2d with Estimated Date of Delivery: 22 who presents with the chief complaint listed above. Pt denies complications with this pregnancy and desires expectant management, epidural when needed.     She sees Angelika Dick MD for her prenatal care. Her pregnancy has been complicated by:  postdates    She describes fetal movement as normal.  She admits to rupture of membranes.  She denies vaginal bleeding. She is not feeling contractions.        Objective   There are no problems to display for this patient.       OB History    Para Term  AB Living   1 0 0 0 0 0   SAB IAB Ectopic Molar Multiple Live Births   0 0 0 0 0 0      # Outcome Date GA Lbr Baldemar/2nd Weight Sex Delivery Anes PTL Lv   1 Current                 History reviewed. No pertinent past medical history.    Past Surgical History:   Procedure Laterality Date   • TONSILLECTOMY     • WISDOM TOOTH EXTRACTION         No current facility-administered medications on file prior to encounter.     Current Outpatient Medications on File Prior to Encounter   Medication Sig Dispense Refill   • Prenatal Vit-Fe Fumarate-FA (prenatal vitamin 27-0.8) 27-0.8 MG tablet tablet Take  by mouth Daily.         Allergies   Allergen Reactions   • Penicillins Rash   • Raspberry Rash       History reviewed. No pertinent family history.    Social History     Socioeconomic History   • Marital status:    Tobacco Use   • Smoking status: Never Smoker   • Smokeless tobacco: Never Used   Vaping Use   • Vaping Use: Never used   Substance and Sexual Activity   • Alcohol use: Never   • " "Drug use: Never           Review of Systems   Constitutional: Negative for chills and fever.   HENT: Negative.    Eyes: Negative for photophobia and visual disturbance.   Respiratory: Negative for shortness of breath.    Cardiovascular: Negative for chest pain.   Gastrointestinal: Negative for nausea.   Genitourinary: Positive for vaginal discharge.   Psychiatric/Behavioral: The patient is not nervous/anxious.           PHYSICAL EXAM:      VITAL SIGNS:  Vitals:    09/16/22 0412 09/16/22 0418   BP: 110/78    BP Location: Left arm    Patient Position: Sitting    Pulse: 84    Resp: 18    Temp: 97.9 °F (36.6 °C)    TempSrc: Oral    SpO2: 100%    Weight: 90.7 kg (200 lb) 90.3 kg (199 lb)   Height: 157.5 cm (62\")             FHT'S:    130 with moderate variability and accels                                     PHYSICAL EXAM:      General: well developed; well nourished  no acute distress   Heart: Not performed.   Lungs   breathing is unlabored   Abdomen: Gravid and non tender     Extremities: trace edema, DTRs 1 plus, no clonus       Cervix: Per RN, grossly ruptured  Cervical Dilation (cm): 1  Cervical Effacement: 50%  Fetal Station: -2  Cervical Consistency: medium  Cervical Position: posterior     Contractions:   irregular, mild                    LABS AND TESTING ORDERED:  1. Uterine and fetal monitoring  2. Urinalysis  3. Admit lbs, covid    LAB RESULTS:    No results found for this or any previous visit (from the past 24 hour(s)).    Lab Results   Component Value Date    ABO O 02/02/2022    RH Positive 02/02/2022       Lab Results   Component Value Date    STREPGPB Negative 08/17/2022                 External Prenatal Results     Pregnancy Outside Results - Transcribed From Office Records - See Scanned Records For Details     Test Value Date Time    ABO  O  02/02/22 1503    Rh  Positive  02/02/22 1503    Antibody Screen  Negative  02/02/22 1503    Varicella IgG       Rubella  >33.00 index 02/02/22 1503    Hgb  13.8 " g/dL 22 1633       12.6 g/dL 06/15/22 1506       13.2 g/dL 22 1503    Hct  42.8 % 22 1633       38.4 % 06/15/22 1506       39.8 % 22 1503    Glucose Fasting GTT       Glucose Tolerance Test 1 hour       Glucose Tolerance Test 3 hour       Gonorrhea (discrete)  Negative  22 1629    Chlamydia (discrete)  Negative  22 1629    RPR  Non Reactive  22 1503    VDRL       Syphilis Antibody       HBsAg  Negative  22 1503    Herpes Simplex Virus PCR       Herpes Simplex VIrus Culture       HIV  Non Reactive  22 1503    Hep C RNA Quant PCR       Hep C Antibody  <0.1 s/co ratio 22 1503    AFP       Group B Strep  Negative  22 1634    GBS Susceptibility to Clindamycin       GBS Susceptibility to Erythromycin       Fetal Fibronectin       Genetic Testing, Maternal Blood             Drug Screening     Test Value Date Time    Urine Drug Screen       Amphetamine Screen       Barbiturate Screen       Benzodiazepine Screen       Methadone Screen       Phencyclidine Screen       Opiates Screen       THC Screen       Cocaine Screen       Propoxyphene Screen       Buprenorphine Screen       Methamphetamine Screen       Oxycodone Screen       Tricyclic Antidepressants Screen             Legend    ^: Historical                          Impression:   @ 40w2d .  Final Diagnosis: ruptured, not in labor    Plan:  1. Dr Lopez notified and will admit pt,  fetal and uterine monitoring  continuously, expectant management and analgesia with  epidural        Alejandrina Cobb MD  2022  04:47 EDT

## 2022-09-16 NOTE — ANESTHESIA PREPROCEDURE EVALUATION
" Anesthesia Evaluation     Patient summary reviewed and Nursing notes reviewed   NPO Solid Status: > 8 hours  NPO Liquid Status: > 2 hours           Airway   Mallampati: II  TM distance: >3 FB  Neck ROM: full  No difficulty expected  Dental      Pulmonary    Cardiovascular         Neuro/Psych  GI/Hepatic/Renal/Endo    (+) obesity,       Musculoskeletal     Abdominal    Substance History      OB/GYN    (+) Pregnant (40+2),         Other                        Anesthesia Plan    ASA 2     epidural     (40w2d    I have reviewed the patient's history with the patient and the chart, including all pertinent laboratory results and imaging. I have explained the risks of epidural anesthesia including but not limited to hypotension, PDPH, nerve injury, and risk of failure/replacement. Patient understands risks and agrees to proceed.    /70   Pulse 76   Temp 36.7 °C (98 °F) (Oral)   Resp 18   Ht 157.5 cm (62\")   Wt 90.3 kg (199 lb)   LMP 12/08/2021   SpO2 100%   Breastfeeding Yes   BMI 36.40 kg/m²   )    Anesthetic plan, risks, benefits, and alternatives have been provided, discussed and informed consent has been obtained with: patient.        CODE STATUS:       "

## 2022-09-17 LAB
DEPRECATED RDW RBC AUTO: 40.7 FL (ref 37–54)
ERYTHROCYTE [DISTWIDTH] IN BLOOD BY AUTOMATED COUNT: 12.3 % (ref 12.3–15.4)
HCT VFR BLD AUTO: 36.5 % (ref 34–46.6)
HGB BLD-MCNC: 12.2 G/DL (ref 12–15.9)
LYMPHOCYTES # BLD MANUAL: 3.21 10*3/MM3 (ref 0.7–3.1)
LYMPHOCYTES NFR BLD MANUAL: 1 % (ref 5–12)
MCH RBC QN AUTO: 30.1 PG (ref 26.6–33)
MCHC RBC AUTO-ENTMCNC: 33.4 G/DL (ref 31.5–35.7)
MCV RBC AUTO: 90.1 FL (ref 79–97)
MONOCYTES # BLD: 0.21 10*3/MM3 (ref 0.1–0.9)
MYELOCYTES NFR BLD MANUAL: 2 % (ref 0–0)
NEUTROPHILS # BLD AUTO: 17.56 10*3/MM3 (ref 1.7–7)
NEUTROPHILS NFR BLD MANUAL: 82 % (ref 42.7–76)
PLAT MORPH BLD: NORMAL
PLATELET # BLD AUTO: 135 10*3/MM3 (ref 140–450)
PMV BLD AUTO: 11.8 FL (ref 6–12)
RBC # BLD AUTO: 4.05 10*6/MM3 (ref 3.77–5.28)
RBC MORPH BLD: NORMAL
VARIANT LYMPHS NFR BLD MANUAL: 15 % (ref 19.6–45.3)
WBC MORPH BLD: NORMAL
WBC NRBC COR # BLD: 21.41 10*3/MM3 (ref 3.4–10.8)

## 2022-09-17 PROCEDURE — 85007 BL SMEAR W/DIFF WBC COUNT: CPT | Performed by: OBSTETRICS & GYNECOLOGY

## 2022-09-17 PROCEDURE — 0503F POSTPARTUM CARE VISIT: CPT | Performed by: STUDENT IN AN ORGANIZED HEALTH CARE EDUCATION/TRAINING PROGRAM

## 2022-09-17 PROCEDURE — 85025 COMPLETE CBC W/AUTO DIFF WBC: CPT | Performed by: OBSTETRICS & GYNECOLOGY

## 2022-09-17 RX ORDER — ACETAMINOPHEN 160 MG/5ML
650 SOLUTION ORAL EVERY 6 HOURS PRN
Status: DISCONTINUED | OUTPATIENT
Start: 2022-09-17 | End: 2022-09-17

## 2022-09-17 RX ORDER — ACETAMINOPHEN 325 MG/1
650 TABLET ORAL EVERY 6 HOURS PRN
Status: DISCONTINUED | OUTPATIENT
Start: 2022-09-17 | End: 2022-09-18 | Stop reason: HOSPADM

## 2022-09-17 RX ORDER — METHYLERGONOVINE MALEATE 0.2 MG/ML
200 INJECTION INTRAVENOUS ONCE AS NEEDED
Status: DISCONTINUED | OUTPATIENT
Start: 2022-09-17 | End: 2022-09-18 | Stop reason: HOSPADM

## 2022-09-17 RX ORDER — DOCUSATE SODIUM 100 MG/1
100 CAPSULE, LIQUID FILLED ORAL 2 TIMES DAILY
Status: DISCONTINUED | OUTPATIENT
Start: 2022-09-17 | End: 2022-09-18 | Stop reason: HOSPADM

## 2022-09-17 RX ORDER — HYDROXYZINE 50 MG/1
50 TABLET, FILM COATED ORAL NIGHTLY PRN
Status: DISCONTINUED | OUTPATIENT
Start: 2022-09-17 | End: 2022-09-18 | Stop reason: HOSPADM

## 2022-09-17 RX ORDER — BISACODYL 10 MG
10 SUPPOSITORY, RECTAL RECTAL DAILY PRN
Status: DISCONTINUED | OUTPATIENT
Start: 2022-09-17 | End: 2022-09-18 | Stop reason: HOSPADM

## 2022-09-17 RX ORDER — SODIUM CHLORIDE 0.9 % (FLUSH) 0.9 %
1-10 SYRINGE (ML) INJECTION AS NEEDED
Status: DISCONTINUED | OUTPATIENT
Start: 2022-09-17 | End: 2022-09-18 | Stop reason: HOSPADM

## 2022-09-17 RX ORDER — PRENATAL VIT/IRON FUM/FOLIC AC 27MG-0.8MG
1 TABLET ORAL DAILY
Status: DISCONTINUED | OUTPATIENT
Start: 2022-09-17 | End: 2022-09-18 | Stop reason: HOSPADM

## 2022-09-17 RX ORDER — HYDROCORTISONE 25 MG/G
1 CREAM TOPICAL AS NEEDED
Status: DISCONTINUED | OUTPATIENT
Start: 2022-09-17 | End: 2022-09-18 | Stop reason: HOSPADM

## 2022-09-17 RX ADMIN — DOCUSATE SODIUM 100 MG: 100 CAPSULE, LIQUID FILLED ORAL at 21:11

## 2022-09-17 RX ADMIN — Medication 1 TABLET: at 08:26

## 2022-09-17 RX ADMIN — ACETAMINOPHEN 650 MG: 325 TABLET, FILM COATED ORAL at 18:37

## 2022-09-17 RX ADMIN — IBUPROFEN 600 MG: 600 TABLET ORAL at 22:44

## 2022-09-17 RX ADMIN — DOCUSATE SODIUM 100 MG: 100 CAPSULE, LIQUID FILLED ORAL at 08:27

## 2022-09-17 RX ADMIN — Medication 1 APPLICATION: at 08:26

## 2022-09-17 RX ADMIN — IBUPROFEN 600 MG: 600 TABLET ORAL at 14:53

## 2022-09-17 RX ADMIN — ACETAMINOPHEN 650 MG: 325 TABLET, FILM COATED ORAL at 11:01

## 2022-09-17 RX ADMIN — IBUPROFEN 600 MG: 600 TABLET ORAL at 07:18

## 2022-09-17 RX ADMIN — Medication: at 08:25

## 2022-09-17 NOTE — L&D DELIVERY NOTE
Delivery Note    Obstetrician:   Fidel Flannery MD      Pre-Delivery Diagnosis: 1.  Intrauterine pregnancy at 40-2/7 weeks  2.  Rupture of membranes    Post-Delivery Diagnosis: Same    Procedure: Spontaneous vaginal delivery     Episiotomy or Incision: none    27-year-old  1 para 0 presented at 40-2/7 weeks with spontaneous rupture of membranes.  Group B strep status was negative.  Fetal heart tones were reactive.  The patient was not laboring.      Initially, the patient was observed at her request.  She did not proceed spontaneously into labor.  For this reason, induction was undertaken using Pitocin.  An epidural catheter was placed for pain control.  The patient progressed along an adequate labor curve to complete effacement and dilation as well as 0 station of the presenting part.      The patient was allowed to labor down and began to push at +2 station.  She pushed to spontaneous vaginal delivery of the fetal head from direct occiput anterior presentation.  The mouth and naris were suctioned with a bulb while on the perineum.  Shoulders were delivered without difficulty, followed by the remainder of the infant.      After a  delay of 30 seconds, the cord was doubly clamped and divided.  The infant was then placed on the mother's chest for Kangaroo care.      The perineum was inspected.  There was a second-degree midline laceration.  This was repaired with 3-0 Monocryl in layers.      The placenta  spontaneously.  It was intact and had a three-vessel cord.  The perineum was inspected.  There were no additional lacerations.  The cervix was examined and it was intact.  The rectum was examined.  It was also intact.             Apgars: 1' 9  /  5' 9     Placenta and Cord:          Mechanism: spontaneous        Description:  complete    Estimated Blood Loss:  442cc                             Complications:  None           Condition: Stable    Male       2022  Fidel Flannery MD

## 2022-09-17 NOTE — LACTATION NOTE
P1T. Baby Gregory has just been circ'd. Once unswaddled her was rooting and crying and latched easily to the left breast in cradle hold. He started with a deep , nutritive suckle but soon was asleep. Reassured patient that nursing and expressing breastmilk into his mouth was pain control for him and very soothing.Enc S2S also to comfort infant and encouraged feeding. Mom has a PBP at home. Directed to the provided booklet and safe storage for expressed milk chart and various videos. Patient will call LC as needed.  Lactation Consult Note    Evaluation Completed: 2022 16:10 EDT  Patient Name: Jannette Moy  :  1995  MRN:  [id number]     REFERRAL  INFORMATION:                          Date of Referral: 22   Person Making Referral: patient, nurse  Maternal Reason for Referral: breastfeeding currently, no prior breastfeeding experience  Infant Reason for Referral: sleepy    DELIVERY HISTORY:        Skin to skin initiation date/time: 2022  9:19 PM   Skin to skin end date/time: 2022  10:25 PM        MATERNAL ASSESSMENT:     Breast Shape: round (22 1500)  Breast Density: soft (22 1500)  Areola: elastic (22 1500)  Nipples: everted (22 1500)     Left Nipple Symptoms: intact (22 1500)          INFANT ASSESSMENT:  Information for the patient's :  Jimi Moy [[id number]]   No past medical history on file.                                                                                                     MATERNAL INFANT FEEDING:     Maternal Emotional State: receptive, relaxed (22 1500)  Infant Positioning: cross-cradle (22 1500)   Signs of Milk Transfer: suck/swallow ratio, transfer present, deep jaw excursions noted (22 1500)  Pain with Feeding: no (22 1500)           Milk Ejection Reflex: present (22 1500)  Comfort Measures Following Feeding: air-drying encouraged, breast cream/oil applied, expressed milk applied, soap use  discouraged (09/17/22 1500)        Latch Assistance: minimal assistance (09/17/22 1500)                               EQUIPMENT TYPE:  Breast Pump Type: double electric, personal (09/17/22 1500)                              BREAST PUMPING:          LACTATION REFERRALS:  Lactation Referrals: home health care, support group (09/17/22 1500)

## 2022-09-17 NOTE — PLAN OF CARE
Problem: Adult Inpatient Plan of Care  Goal: Plan of Care Review  Outcome: Ongoing, Progressing  Flowsheets  Taken 9/17/2022 0025 by Yonatan Craig RN  Plan of Care Reviewed With:   spouse   patient  Taken 9/16/2022 2300 by Giselle Cornelius RN  Outcome Evaluation: Pt delivered baby boy at 2118.  mL. Pain controlled by epidural and ibuprofen. Bleeding light, fundus firm without massage and at umbilicus. Anticipate transfer to /B.  Goal: Patient-Specific Goal (Individualized)  Outcome: Ongoing, Progressing  Flowsheets (Taken 9/16/2022 0625 by Alize Colvin, RN)  Patient-Specific Goals (Include Timeframe): healthy mom and baby by discharge  Individualized Care Needs:   BRADY   breastfeeding   pain control  Anxieties, Fears or Concerns: first labor/ delivery process  Goal: Absence of Hospital-Acquired Illness or Injury  Outcome: Ongoing, Progressing  Intervention: Identify and Manage Fall Risk  Recent Flowsheet Documentation  Taken 9/16/2022 2145 by Giselle Cornelius RN  Safety Promotion/Fall Prevention: safety round/check completed  Taken 9/16/2022 1930 by Giselle Cornelius RN  Safety Promotion/Fall Prevention: safety round/check completed  Intervention: Prevent Skin Injury  Recent Flowsheet Documentation  Taken 9/16/2022 2145 by Giselle Cornelius RN  Body Position: position changed independently  Intervention: Prevent and Manage VTE (Venous Thromboembolism) Risk  Recent Flowsheet Documentation  Taken 9/16/2022 2145 by Giselle Cornelius RN  Activity Management: activity adjusted per tolerance  Goal: Optimal Comfort and Wellbeing  Outcome: Ongoing, Progressing  Intervention: Provide Person-Centered Care  Recent Flowsheet Documentation  Taken 9/16/2022 2145 by Giselle Cornelius RN  Trust Relationship/Rapport:   care explained   choices provided   questions answered   empathic listening provided   emotional support provided   questions encouraged   reassurance provided   thoughts/feelings acknowledged  Taken 9/16/2022 1930 by  Giselle Cornelius RN  Trust Relationship/Rapport:   care explained   emotional support provided   choices provided   empathic listening provided   questions encouraged   questions answered   reassurance provided   thoughts/feelings acknowledged  Goal: Readiness for Transition of Care  Outcome: Ongoing, Progressing     Problem: Bleeding (Labor)  Goal: Hemostasis  Outcome: Ongoing, Progressing     Problem: Change in Fetal Wellbeing (Labor)  Goal: Stable Fetal Wellbeing  Outcome: Ongoing, Progressing  Intervention: Promote and Monitor Fetal Wellbeing  Recent Flowsheet Documentation  Taken 2022 by Giselle Cornelius RN  Body Position: position changed independently     Problem: Delayed Labor Progression (Labor)  Goal: Effective Progression to Delivery  Outcome: Ongoing, Progressing     Problem: Infection (Labor)  Goal: Absence of Infection Signs and Symptoms  Outcome: Ongoing, Progressing     Problem: Labor Pain (Labor)  Goal: Acceptable Pain Control  Outcome: Ongoing, Progressing     Problem: Uterine Tachysystole (Labor)  Goal: Normal Uterine Contraction Pattern  Outcome: Ongoing, Progressing     Problem: Pain Acute  Goal: Acceptable Pain Control and Functional Ability  Outcome: Ongoing, Progressing  Intervention: Optimize Psychosocial Wellbeing  Recent Flowsheet Documentation  Taken 2022 by Giselle Cornelius RN  Diversional Activities:   smartphone   television     Problem:  Fall Injury Risk  Goal: Absence of Fall, Infant Drop and Related Injury  Outcome: Ongoing, Progressing  Intervention: Promote Injury-Free Environment  Recent Flowsheet Documentation  Taken 2022 by Giselle Cornelius RN  Safety Promotion/Fall Prevention: safety round/check completed  Taken 2022 by Giselle Cornelius RN  Safety Promotion/Fall Prevention: safety round/check completed     Problem: Skin Injury Risk Increased  Goal: Skin Health and Integrity  Outcome: Ongoing, Progressing     Problem: Adjustment to Role  Transition (Postpartum Vaginal Delivery)  Goal: Successful Maternal Role Transition  Outcome: Ongoing, Progressing     Problem: Bleeding (Postpartum Vaginal Delivery)  Goal: Hemostasis  Outcome: Ongoing, Progressing     Problem: Infection (Postpartum Vaginal Delivery)  Goal: Absence of Infection Signs/Symptoms  Outcome: Ongoing, Progressing     Problem: Pain (Postpartum Vaginal Delivery)  Goal: Acceptable Pain Control  Outcome: Ongoing, Progressing     Problem: Urinary Retention (Postpartum Vaginal Delivery)  Goal: Effective Urinary Elimination  Outcome: Ongoing, Progressing     Problem: Breastfeeding  Goal: Effective Breastfeeding  Outcome: Ongoing, Progressing   Goal Outcome Evaluation:              Outcome Evaluation: Pt delivered baby boy at 2118.  mL. Pain controlled by epidural and ibuprofen. Bleeding light, fundus firm without massage and at umbilicus. Anticipate transfer to /B.

## 2022-09-17 NOTE — PROGRESS NOTES
Postpartum Progress Note      Status post Vaginal Delivery: Doing well postoperatively.     1) postpartum care immediately following delivery :    - Continue routine postpartum care.     Rh status: O positive   Rubella: Immune  Gender: Male infant, desires circumcision. I discussed that circumcision is an elective procedure and reviewed risks including bleeding, infection, damage to surrounding structures, and cosmesis. All questions answered. Consents signed.      Subjective     Postpartum Day 1: Vaginal delivery    The patient feels well. The patient denies emotional concerns. Pain is well controlled with current medications. The baby is well. The patient is ambulating well. The patient is tolerating a normal diet.     Objective     Vital signs in last 24 hours:  Temp:  [97.1 °F (36.2 °C)-99.2 °F (37.3 °C)] 98 °F (36.7 °C)  Heart Rate:  [] 93  Resp:  [16-18] 16  BP: ()/(45-83) 101/64      General:    alert, appears stated age and cooperative   Lungs:  no increased work of breathing, regular respirations    Abdomen:  Soft, Non-tender    Lochia:  appropriate   Uterine Fundus:   firm   Ext    Trace lower extremity edema    DVT Evaluation:  No cords or calf tenderness.     Lab Results   Component Value Date    WBC 21.41 (H) 09/17/2022    HGB 12.2 09/17/2022    HCT 36.5 09/17/2022    MCV 90.1 09/17/2022     (L) 09/17/2022       Divya Schreiber MD  9/17/2022  09:43 EDT

## 2022-09-17 NOTE — PLAN OF CARE
Goal Outcome Evaluation:           Progress: improving  Outcome Evaluation: VSS, pain well controlled, ambulating well, breastfeeding improving, d/c tomorrow

## 2022-09-17 NOTE — PLAN OF CARE
Goal Outcome Evaluation:  Plan of Care Reviewed With: spouse, patient           Outcome Evaluation: stable. admit from L&D. IVFs infusing. DTV. Oriented to room, routine and folder. breast feeding. questions answered. Infant bracelts matched with L&D RN. Nsy RN on way to assess. no c/o or concerns voiced at present. will continue to monitor.

## 2022-09-18 VITALS
HEIGHT: 62 IN | SYSTOLIC BLOOD PRESSURE: 107 MMHG | RESPIRATION RATE: 19 BRPM | BODY MASS INDEX: 36.62 KG/M2 | WEIGHT: 199 LBS | OXYGEN SATURATION: 97 % | DIASTOLIC BLOOD PRESSURE: 67 MMHG | TEMPERATURE: 98.4 F | HEART RATE: 86 BPM

## 2022-09-18 PROCEDURE — 0503F POSTPARTUM CARE VISIT: CPT | Performed by: OBSTETRICS & GYNECOLOGY

## 2022-09-18 RX ORDER — IBUPROFEN 600 MG/1
600 TABLET ORAL EVERY 8 HOURS PRN
Qty: 30 TABLET | Refills: 0 | Status: SHIPPED | OUTPATIENT
Start: 2022-09-18 | End: 2023-03-29

## 2022-09-18 RX ORDER — PSEUDOEPHEDRINE HCL 30 MG
100 TABLET ORAL 2 TIMES DAILY
Qty: 20 CAPSULE | Refills: 0 | Status: SHIPPED | OUTPATIENT
Start: 2022-09-18 | End: 2023-03-29

## 2022-09-18 RX ADMIN — Medication 1 TABLET: at 09:16

## 2022-09-18 RX ADMIN — DOCUSATE SODIUM 100 MG: 100 CAPSULE, LIQUID FILLED ORAL at 09:16

## 2022-09-18 RX ADMIN — IBUPROFEN 600 MG: 600 TABLET ORAL at 09:16

## 2022-09-18 NOTE — LACTATION NOTE
Patient preparing for discharge . Baby has been reluctant to latch onto the left breast but mom is resourceful. She pumped and syringe fed eventually getting him latched. Breasts are filling. Discussed management of engorgement and nipple care. Reviewed lactation resources and gave numbers encouraging follow-up in [facility].

## 2022-09-18 NOTE — PLAN OF CARE
Goal Outcome Evaluation:  Plan of Care Reviewed With: patient, spouse  Progress: improving  Outcome Evaluation: VSS. Minimal complaints of pain, well controlled with PRN medications. Ambulating independently and voiding spontaneously. Breastfeeding, supplemented x1 with formula. Discharge home today.

## 2022-09-18 NOTE — DISCHARGE SUMMARY
Date of Discharge:  2022    Discharge Diagnosis: Intrauterine pregnancy at 40-2/7 weeks, delivered    Presenting Problem/History of Present Illness  Pregnancy [Z34.90]       Hospital Course  Patient is a 27 y.o. female presented with rupture of membranes at 40-2/7 weeks.  The patient progressed to spontaneous vaginal delivery of a vigorous .  For event surrounding delivery, please see the delivery note.  The postpartum course was smooth.  By postpartum day #2, the patient was afebrile, tolerating a regular diet and her lochia was minimal.  At this point, the patient requested discharge home and I felt that she was stable for this..      Procedures Performed         Consults:   Consults     No orders found from 2022 to 2022.              Condition on Discharge: Stable    Vital Signs  Temp:  [98 °F (36.7 °C)-98.4 °F (36.9 °C)] 98.4 °F (36.9 °C)  Heart Rate:  [86-88] 86  Resp:  [16-19] 19  BP: (107-114)/(67-74) 107/67    Physical Exam:   The abdomen is soft and nondistended.  Fundus is firm and below the umbilicus.  Extremities are equal in size with 1+ bipedal edema    Discharge Disposition  Home or Self Care    Discharge Medications     Discharge Medications      New Medications      Instructions Start Date   docusate sodium 100 MG capsule   100 mg, Oral, 2 Times Daily      ibuprofen 600 MG tablet  Commonly known as: ADVIL,MOTRIN   600 mg, Oral, Every 8 Hours PRN         Continue These Medications      Instructions Start Date   prenatal vitamin 27-0.8 27-0.8 MG tablet tablet   Oral, Daily             Discharge Diet:     Activity at Discharge:     Follow-up Appointments  Future Appointments   Date Time Provider Department Center   2022  2:20 PM  [facility] [place] [facility]   2022  3:45 PM [facility]     Additional Instructions for the Follow-ups that You Need to Schedule     Call MD With Problems / Concerns   As directed      Instructions: Call  for fever, severe abdominal pain, heavy bleeding or any other concerns    Order Comments: Instructions: Call for fever, severe abdominal pain, heavy bleeding or any other concerns          Discharge Follow-up with Specified Provider: Dr Dick; 6 Weeks   As directed      To: Dr Dick    Follow Up: 6 Weeks               Test Results Pending at Discharge       Fidel Flannery MD  09/18/22  12:18 EDT

## 2022-09-21 ENCOUNTER — APPOINTMENT (OUTPATIENT)
Dept: GENERAL RADIOLOGY | Facility: HOSPITAL | Age: 27
End: 2022-09-21

## 2022-09-21 ENCOUNTER — APPOINTMENT (OUTPATIENT)
Dept: ULTRASOUND IMAGING | Facility: HOSPITAL | Age: 27
End: 2022-09-21

## 2022-09-21 ENCOUNTER — HOSPITAL ENCOUNTER (EMERGENCY)
Facility: HOSPITAL | Age: 27
Discharge: HOME | End: 2022-09-21
Attending: EMERGENCY MEDICINE | Admitting: EMERGENCY MEDICINE

## 2022-09-21 ENCOUNTER — TELEPHONE (OUTPATIENT)
Dept: OBSTETRICS AND GYNECOLOGY | Facility: CLINIC | Age: 27
End: 2022-09-21

## 2022-09-21 VITALS
RESPIRATION RATE: 18 BRPM | SYSTOLIC BLOOD PRESSURE: 107 MMHG | WEIGHT: 185 LBS | TEMPERATURE: 98.9 F | DIASTOLIC BLOOD PRESSURE: 73 MMHG | OXYGEN SATURATION: 95 % | BODY MASS INDEX: 34.04 KG/M2 | HEART RATE: 83 BPM | HEIGHT: 62 IN

## 2022-09-21 DIAGNOSIS — R50.9 FEVER AND CHILLS: Primary | ICD-10-CM

## 2022-09-21 LAB
ALBUMIN SERPL-MCNC: 3.6 G/DL (ref 3.5–5.2)
ALBUMIN/GLOB SERPL: 1.3 G/DL
ALP SERPL-CCNC: 181 U/L (ref 39–117)
ALT SERPL W P-5'-P-CCNC: 33 U/L (ref 1–33)
ANION GAP SERPL CALCULATED.3IONS-SCNC: 12.9 MMOL/L (ref 5–15)
AST SERPL-CCNC: 24 U/L (ref 1–32)
B PARAPERT DNA SPEC QL NAA+PROBE: NOT DETECTED
B PERT DNA SPEC QL NAA+PROBE: NOT DETECTED
BACTERIA UR QL AUTO: ABNORMAL /HPF
BASOPHILS # BLD AUTO: 0.06 10*3/MM3 (ref 0–0.2)
BASOPHILS NFR BLD AUTO: 0.6 % (ref 0–1.5)
BILIRUB SERPL-MCNC: 0.4 MG/DL (ref 0–1.2)
BILIRUB UR QL STRIP: NEGATIVE
BILIRUB UR QL STRIP: NEGATIVE
BUN SERPL-MCNC: 12 MG/DL (ref 6–20)
BUN/CREAT SERPL: 14.8 (ref 7–25)
C PNEUM DNA NPH QL NAA+NON-PROBE: NOT DETECTED
CALCIUM SPEC-SCNC: 8.6 MG/DL (ref 8.6–10.5)
CHLORIDE SERPL-SCNC: 103 MMOL/L (ref 98–107)
CLARITY UR: ABNORMAL
CLARITY UR: CLEAR
CO2 SERPL-SCNC: 24.1 MMOL/L (ref 22–29)
COLOR UR: YELLOW
COLOR UR: YELLOW
CREAT SERPL-MCNC: 0.81 MG/DL (ref 0.57–1)
D-LACTATE SERPL-SCNC: 0.7 MMOL/L (ref 0.5–2)
DEPRECATED RDW RBC AUTO: 40.9 FL (ref 37–54)
EGFRCR SERPLBLD CKD-EPI 2021: 100.9 ML/MIN/1.73
EOSINOPHIL # BLD AUTO: 0.13 10*3/MM3 (ref 0–0.4)
EOSINOPHIL NFR BLD AUTO: 1.3 % (ref 0.3–6.2)
ERYTHROCYTE [DISTWIDTH] IN BLOOD BY AUTOMATED COUNT: 12.4 % (ref 12.3–15.4)
FLUAV SUBTYP SPEC NAA+PROBE: NOT DETECTED
FLUBV RNA ISLT QL NAA+PROBE: NOT DETECTED
GLOBULIN UR ELPH-MCNC: 2.8 GM/DL
GLUCOSE SERPL-MCNC: 89 MG/DL (ref 65–99)
GLUCOSE UR STRIP-MCNC: NEGATIVE MG/DL
GLUCOSE UR STRIP-MCNC: NEGATIVE MG/DL
HADV DNA SPEC NAA+PROBE: NOT DETECTED
HCOV 229E RNA SPEC QL NAA+PROBE: NOT DETECTED
HCOV HKU1 RNA SPEC QL NAA+PROBE: NOT DETECTED
HCOV NL63 RNA SPEC QL NAA+PROBE: NOT DETECTED
HCOV OC43 RNA SPEC QL NAA+PROBE: NOT DETECTED
HCT VFR BLD AUTO: 41.1 % (ref 34–46.6)
HGB BLD-MCNC: 13.7 G/DL (ref 12–15.9)
HGB UR QL STRIP.AUTO: ABNORMAL
HGB UR QL STRIP.AUTO: NEGATIVE
HMPV RNA NPH QL NAA+NON-PROBE: NOT DETECTED
HPIV1 RNA ISLT QL NAA+PROBE: NOT DETECTED
HPIV2 RNA SPEC QL NAA+PROBE: NOT DETECTED
HPIV3 RNA NPH QL NAA+PROBE: NOT DETECTED
HPIV4 P GENE NPH QL NAA+PROBE: NOT DETECTED
HYALINE CASTS UR QL AUTO: ABNORMAL /LPF
IMM GRANULOCYTES # BLD AUTO: 0.12 10*3/MM3 (ref 0–0.05)
IMM GRANULOCYTES NFR BLD AUTO: 1.2 % (ref 0–0.5)
KETONES UR QL STRIP: NEGATIVE
KETONES UR QL STRIP: NEGATIVE
LEUKOCYTE ESTERASE UR QL STRIP.AUTO: ABNORMAL
LEUKOCYTE ESTERASE UR QL STRIP.AUTO: NEGATIVE
LYMPHOCYTES # BLD AUTO: 1.35 10*3/MM3 (ref 0.7–3.1)
LYMPHOCYTES NFR BLD AUTO: 13 % (ref 19.6–45.3)
M PNEUMO IGG SER IA-ACNC: NOT DETECTED
MCH RBC QN AUTO: 30.2 PG (ref 26.6–33)
MCHC RBC AUTO-ENTMCNC: 33.3 G/DL (ref 31.5–35.7)
MCV RBC AUTO: 90.7 FL (ref 79–97)
MONOCYTES # BLD AUTO: 0.92 10*3/MM3 (ref 0.1–0.9)
MONOCYTES NFR BLD AUTO: 8.9 % (ref 5–12)
NEUTROPHILS NFR BLD AUTO: 7.78 10*3/MM3 (ref 1.7–7)
NEUTROPHILS NFR BLD AUTO: 75 % (ref 42.7–76)
NITRITE UR QL STRIP: NEGATIVE
NITRITE UR QL STRIP: NEGATIVE
NRBC BLD AUTO-RTO: 0 /100 WBC (ref 0–0.2)
PH UR STRIP.AUTO: 5.5 [PH] (ref 5–8)
PH UR STRIP.AUTO: 6 [PH] (ref 5–8)
PLATELET # BLD AUTO: 212 10*3/MM3 (ref 140–450)
PMV BLD AUTO: 10.3 FL (ref 6–12)
POTASSIUM SERPL-SCNC: 3.7 MMOL/L (ref 3.5–5.2)
PROCALCITONIN SERPL-MCNC: 0.05 NG/ML (ref 0–0.25)
PROT SERPL-MCNC: 6.4 G/DL (ref 6–8.5)
PROT UR QL STRIP: NEGATIVE
PROT UR QL STRIP: NEGATIVE
RBC # BLD AUTO: 4.53 10*6/MM3 (ref 3.77–5.28)
RBC # UR STRIP: ABNORMAL /HPF
REF LAB TEST METHOD: ABNORMAL
RHINOVIRUS RNA SPEC NAA+PROBE: NOT DETECTED
RSV RNA NPH QL NAA+NON-PROBE: NOT DETECTED
SARS-COV-2 RNA NPH QL NAA+NON-PROBE: NOT DETECTED
SODIUM SERPL-SCNC: 140 MMOL/L (ref 136–145)
SP GR UR STRIP: 1.02 (ref 1–1.03)
SP GR UR STRIP: 1.02 (ref 1–1.03)
SQUAMOUS #/AREA URNS HPF: ABNORMAL /HPF
UROBILINOGEN UR QL STRIP: ABNORMAL
UROBILINOGEN UR QL STRIP: NORMAL
WBC # UR STRIP: ABNORMAL /HPF
WBC NRBC COR # BLD: 10.36 10*3/MM3 (ref 3.4–10.8)

## 2022-09-21 PROCEDURE — 81001 URINALYSIS AUTO W/SCOPE: CPT | Performed by: NURSE PRACTITIONER

## 2022-09-21 PROCEDURE — 85025 COMPLETE CBC W/AUTO DIFF WBC: CPT | Performed by: NURSE PRACTITIONER

## 2022-09-21 PROCEDURE — 0202U NFCT DS 22 TRGT SARS-COV-2: CPT | Performed by: NURSE PRACTITIONER

## 2022-09-21 PROCEDURE — 84145 PROCALCITONIN (PCT): CPT | Performed by: NURSE PRACTITIONER

## 2022-09-21 PROCEDURE — 83605 ASSAY OF LACTIC ACID: CPT | Performed by: NURSE PRACTITIONER

## 2022-09-21 PROCEDURE — P9612 CATHETERIZE FOR URINE SPEC: HCPCS

## 2022-09-21 PROCEDURE — 81003 URINALYSIS AUTO W/O SCOPE: CPT | Performed by: EMERGENCY MEDICINE

## 2022-09-21 PROCEDURE — 76856 US EXAM PELVIC COMPLETE: CPT

## 2022-09-21 PROCEDURE — 99284 EMERGENCY DEPT VISIT MOD MDM: CPT

## 2022-09-21 PROCEDURE — 80053 COMPREHEN METABOLIC PANEL: CPT | Performed by: NURSE PRACTITIONER

## 2022-09-21 PROCEDURE — 71045 X-RAY EXAM CHEST 1 VIEW: CPT

## 2022-09-21 RX ORDER — SODIUM CHLORIDE 0.9 % (FLUSH) 0.9 %
10 SYRINGE (ML) INJECTION AS NEEDED
Status: DISCONTINUED | OUTPATIENT
Start: 2022-09-21 | End: 2022-09-21 | Stop reason: HOSPADM

## 2022-09-21 RX ORDER — ACETAMINOPHEN 500 MG
1000 TABLET ORAL ONCE
Status: COMPLETED | OUTPATIENT
Start: 2022-09-21 | End: 2022-09-21

## 2022-09-21 RX ADMIN — SODIUM CHLORIDE 1000 ML: 9 INJECTION, SOLUTION INTRAVENOUS at 11:35

## 2022-09-21 RX ADMIN — ACETAMINOPHEN 1000 MG: 500 TABLET, FILM COATED ORAL at 11:23

## 2022-09-21 NOTE — DISCHARGE INSTRUCTIONS
You have been given an emergency department evaluation.  This evaluation is intended to rule out life-threatening conditions.  You could require further testing as determined by your primary care physician or any referred specialist.  Take your prescribed medications  Follow-up with your OB/GYN at your scheduled follow-up appointment or sooner if needed, call them tomorrow.  Return to the emergency department if you experience chest pain, shortness of breath, abdominal pain, fever greater than 102, intractable vomiting, or any new or worsening symptoms.

## 2022-09-21 NOTE — ED TRIAGE NOTES
All triage performed with this RN wearing appropriate PPE.  Pt placed in mask upon arrival to ED.  Patient c/o fever since 3 am. She is 5 days s/p vaginal delivery. She is breast feeding but denies redness/swelling.

## 2022-09-21 NOTE — ED PROVIDER NOTES
EMERGENCY DEPARTMENT ENCOUNTER    Room Number:  12/12  Date of encounter:  9/21/2022  PCP: Anel Moore MD  Historian: Patient  OB/GYN: Dr. Flannery  PPE    Patient was placed in face mask in first look. Patient was wearing facemask when I entered the room and throughout our encounter. I wore full protective equipment throughout this patient encounter including a face mask, and gloves. Hand hygiene was performed before donning protective equipment and after removal when leaving the room.        HPI:  Chief Complaint: Fever and chills  A complete HPI/ROS/PMH/PSH/SH/FH are unobtainable due to: Nothing    Context: Jannette Moy is a 27 y.o. female, 5 days postpartum day, vaginal delivery, breast-feeding who arrives to the ED via private vehicle.  Patient presents with c/o fever and chills.  Patient states that she started having chills yesterday however around 3 AM this morning she woke up running a fever of 101.7 with worsening chills.   Patient also complains of mild headache.  Patient denies chest pain, shortness of breath, dizziness, abdominal pain, dysuria, cough, breast pain or redness.  Patient states that Tylenol makes the symptoms better and not taking Tylenol worsens symptoms.          PAST MEDICAL HISTORY  Active Ambulatory Problems     Diagnosis Date Noted   • Pregnancy 09/16/2022     Resolved Ambulatory Problems     Diagnosis Date Noted   • No Resolved Ambulatory Problems     No Additional Past Medical History         PAST SURGICAL HISTORY  Past Surgical History:   Procedure Laterality Date   • TONSILLECTOMY     • WISDOM TOOTH EXTRACTION           FAMILY HISTORY  History reviewed. No pertinent family history.      SOCIAL HISTORY  Social History     Socioeconomic History   • Marital status:    Tobacco Use   • Smoking status: Never Smoker   • Smokeless tobacco: Never Used   Vaping Use   • Vaping Use: Never used   Substance and Sexual Activity   • Alcohol use: Never   • Drug use: Never          ALLERGIES  Penicillins and Raspberry        REVIEW OF SYSTEMS  Review of Systems     All systems reviewed and negative except for those discussed in HPI.        PHYSICAL EXAM    ED Triage Vitals   Temp Heart Rate Resp BP SpO2   09/21/22 1049 09/21/22 1049 09/21/22 1049 09/21/22 1122 09/21/22 1049   (!) 101.5 °F (38.6 °C) (!) 123 18 104/74 98 %         Physical Exam  GENERAL: Well appearing, nontoxic appearing, not distressed  HENT: normocephalic, atraumatic  EYES: no scleral icterus, PERRL  CV: regular rhythm, tachycardic, no murmur  RESPIRATORY: normal effort, CTAB  ABDOMEN: soft, normal bowel sounds, nontender  MUSCULOSKELETAL: no deformity, no calf tenderness or lower extremity edema  NEURO: alert, moves all extremities, follows commands, mental status normal/baseline  SKIN: warm, dry, no rash, no erythema noted to bilateral breast  Psych: Appropriate mood and affect  Nursing notes and vital signs reviewed      LAB RESULTS  Recent Results (from the past 24 hour(s))   Urinalysis With Microscopic If Indicated (No Culture) - Urine, Catheter    Collection Time: 09/21/22 11:20 AM    Specimen: Urine, Catheter   Result Value Ref Range    Color, UA Yellow Yellow, Straw    Appearance, UA Cloudy (A) Clear    pH, UA 6.0 5.0 - 8.0    Specific Gravity, UA 1.017 1.005 - 1.030    Glucose, UA Negative Negative    Ketones, UA Negative Negative    Bilirubin, UA Negative Negative    Blood, UA Large (3+) (A) Negative    Protein, UA Negative Negative    Leuk Esterase, UA Large (3+) (A) Negative    Nitrite, UA Negative Negative    Urobilinogen, UA 0.2 E.U./dL 0.2 - 1.0 E.U./dL   Urinalysis, Microscopic Only - Urine, Catheter    Collection Time: 09/21/22 11:20 AM    Specimen: Urine, Catheter   Result Value Ref Range    RBC, UA 31-50 (A) None Seen, 0-2 /HPF    WBC, UA Too Numerous to Count (A) None Seen, 0-2 /HPF    Bacteria, UA None Seen None Seen /HPF    Squamous Epithelial Cells, UA 0-2 None Seen, 0-2 /HPF    Hyaline Casts,  UA 7-12 None Seen /LPF    Methodology Automated Microscopy    Respiratory Panel PCR w/COVID-19(SARS-CoV-2) [facility]WALDO/[facility]EPAK/[facility] In-House, NP Swab in UTM/VTM, 3-4 HR TAT - Swab, Nasopharynx    Collection Time: 09/21/22 11:24 AM    Specimen: Nasopharynx; Swab   Result Value Ref Range    ADENOVIRUS, PCR Not Detected Not Detected    Coronavirus 229E Not Detected Not Detected    Coronavirus HKU1 Not Detected Not Detected    Coronavirus NL63 Not Detected Not Detected    Coronavirus OC43 Not Detected Not Detected    COVID19 Not Detected Not Detected - Ref. Range    Human Metapneumovirus Not Detected Not Detected    Human Rhinovirus/Enterovirus Not Detected Not Detected    Influenza A PCR Not Detected Not Detected    Influenza B PCR Not Detected Not Detected    Parainfluenza Virus 1 Not Detected Not Detected    Parainfluenza Virus 2 Not Detected Not Detected    Parainfluenza Virus 3 Not Detected Not Detected    Parainfluenza Virus 4 Not Detected Not Detected    RSV, PCR Not Detected Not Detected    Bordetella pertussis pcr Not Detected Not Detected    Bordetella parapertussis PCR Not Detected Not Detected    Chlamydophila pneumoniae PCR Not Detected Not Detected    Mycoplasma pneumo by PCR Not Detected Not Detected   Comprehensive Metabolic Panel    Collection Time: 09/21/22 11:27 AM    Specimen: Blood   Result Value Ref Range    Glucose 89 65 - 99 mg/dL    BUN 12 6 - 20 mg/dL    Creatinine 0.81 0.57 - 1.00 mg/dL    Sodium 140 136 - 145 mmol/L    Potassium 3.7 3.5 - 5.2 mmol/L    Chloride 103 98 - 107 mmol/L    CO2 24.1 22.0 - 29.0 mmol/L    Calcium 8.6 8.6 - 10.5 mg/dL    Total Protein 6.4 6.0 - 8.5 g/dL    Albumin 3.60 3.50 - 5.20 g/dL    ALT (SGPT) 33 1 - 33 U/L    AST (SGOT) 24 1 - 32 U/L    Alkaline Phosphatase 181 (H) 39 - 117 U/L    Total Bilirubin 0.4 0.0 - 1.2 mg/dL    Globulin 2.8 gm/dL    A/G Ratio 1.3 g/dL    BUN/Creatinine Ratio 14.8 7.0 - 25.0    Anion Gap 12.9 5.0 - 15.0 mmol/L    eGFR 100.9 >60.0  mL/min/1.73   Lactic Acid, Plasma    Collection Time: 09/21/22 11:27 AM    Specimen: Blood   Result Value Ref Range    Lactate 0.7 0.5 - 2.0 mmol/L   Procalcitonin    Collection Time: 09/21/22 11:27 AM    Specimen: Blood   Result Value Ref Range    Procalcitonin 0.05 0.00 - 0.25 ng/mL   CBC Auto Differential    Collection Time: 09/21/22 11:27 AM    Specimen: Blood   Result Value Ref Range    WBC 10.36 3.40 - 10.80 10*3/mm3    RBC 4.53 3.77 - 5.28 10*6/mm3    Hemoglobin 13.7 12.0 - 15.9 g/dL    Hematocrit 41.1 34.0 - 46.6 %    MCV 90.7 79.0 - 97.0 fL    MCH 30.2 26.6 - 33.0 pg    MCHC 33.3 31.5 - 35.7 g/dL    RDW 12.4 12.3 - 15.4 %    RDW-SD 40.9 37.0 - 54.0 fl    MPV 10.3 6.0 - 12.0 fL    Platelets 212 140 - 450 10*3/mm3    Neutrophil % 75.0 42.7 - 76.0 %    Lymphocyte % 13.0 (L) 19.6 - 45.3 %    Monocyte % 8.9 5.0 - 12.0 %    Eosinophil % 1.3 0.3 - 6.2 %    Basophil % 0.6 0.0 - 1.5 %    Immature Grans % 1.2 (H) 0.0 - 0.5 %    Neutrophils, Absolute 7.78 (H) 1.70 - 7.00 10*3/mm3    Lymphocytes, Absolute 1.35 0.70 - 3.10 10*3/mm3    Monocytes, Absolute 0.92 (H) 0.10 - 0.90 10*3/mm3    Eosinophils, Absolute 0.13 0.00 - 0.40 10*3/mm3    Basophils, Absolute 0.06 0.00 - 0.20 10*3/mm3    Immature Grans, Absolute 0.12 (H) 0.00 - 0.05 10*3/mm3    nRBC 0.0 0.0 - 0.2 /100 WBC   Urinalysis With Microscopic If Indicated (No Culture) - Urine, Catheter    Collection Time: 09/21/22 11:57 AM    Specimen: Urine, Catheter   Result Value Ref Range    Color, UA Yellow Yellow, Straw    Appearance, UA Clear Clear    pH, UA 5.5 5.0 - 8.0    Specific Gravity, UA 1.017 1.005 - 1.030    Glucose, UA Negative Negative    Ketones, UA Negative Negative    Bilirubin, UA Negative Negative    Blood, UA Negative Negative    Protein, UA Negative Negative    Leuk Esterase, UA Negative Negative    Nitrite, UA Negative Negative    Urobilinogen, UA 0.2 E.U./dL 0.2 - 1.0 E.U./dL       Ordered the above labs and independently reviewed the  results.      RADIOLOGY  US Pelvis Complete    Result Date: 9/21/2022  US PELVIS COMPLETE-  Clinical: 5 day postpartum, fever  FINDINGS: The uterus is enlarged postpartum, 20 cm in length, 8.3 cm AP and 11 cm transverse. The endometrium is linear at 4 mm in width. No fluid within the endometrial cavity. No retained products of conception seen. The myometrium is slightly heterogenous postpartum. There are some prominent periuterine vessels noted. Neither the right or left ovary could be identified. The remainder is unremarkable.  This report was finalized on 9/21/2022 1:20 PM by Dr. Elmer Ordaz M.D.      XR Chest 1 View    Result Date: 9/21/2022  CHEST SINGLE VIEW  HISTORY: Fever. Patient is 5 days postpartum.  COMPARISON: None  FINDINGS: The cardiomediastinal silhouette is normal. Lungs are clear and there is no evidence for pulmonary edema or pleural effusion or infiltrate.      No evidence for active disease in the chest.  This report was finalized on 9/21/2022 12:11 PM by Dr. Harrison Benavides M.D.        I ordered the above noted radiological studies and viewed the images on the PACS system.           MEDICAL RECORD REVIEW  Records reviewed in epic, patient had an uncomplicated vaginal delivery September 16 and was discharged from the hospital on September 18.      PROCEDURES    Procedures        DIFFERENTIAL DIAGNOSIS  Differential diagnosis include but are not limited to the following: Endometritis, retained products, UTI, viral illness, COVID-19,      PROGRESS, DATA ANALYSIS, CONSULTS, AND MEDICAL DECISION MAKING        ED Course as of 09/21/22 1729   Wed Sep 21, 2022   1109 Patient is a well-appearing 27-year-old status post vaginal delivery 5 days ago who is also currently breast-feeding.  Patient started having chills yesterday began running a fever around 3 AM this morning.  She has a mild headache but no other associated symptoms.  Labs including lactic acid, respiratory viral panel have been ordered.   Chest x-ray to rule out pneumonia, non-OB ultrasound to rule out retained products are endometritis.  Patient will receive IV fluids and Tylenol. [MS]   1130 BP: 104/74 [MS]   1131 Temp(!): 101.5 °F (38.6 °C) [MS]   1131 Heart Rate(!): 123 [MS]   1206 WBC: 10.36 [MS]   1206 Hemoglobin: 13.7 [MS]   1206 Hematocrit: 41.1 [MS]   1206 Lactate: 0.7 [MS]   1302 Heart Rate: 91 [MS]   1302 SpO2: 97 % [MS]   1318 Temp: 98.9 °F (37.2 °C) [MS]   1318 Patient symptoms are down to 98.9 after receiving Tylenol. [MS]   1400 Discussed with Dr Flannery regarding patient's relevant history, exam, workup and ED findings/concerns.  He feels that if we are okay discharging patient she can follow-up in the office as scheduled or sooner as needed.     [MS]   1414 Patient updated on unremarkable work-up done here today.  Patient's white count normal at 10, urinalysis shows no infection, electrolytes are normal, respiratory viral panel and chest x-ray all normal.  Patient's OB ultrasound shows no retained products.  Patient temperature has come down to 98.9, she is not tachycardic at this time.  She is sitting in the room eating and has tolerated fluid.  She is wanting to go home at this time.  I told her to have close follow-up with her OB/GYN and she was given strict return to ER precautions. [MS]      ED Course User Index  [MS] Kylie Da Silva APRN     Discussed plan for discharge, as there is no emergent indication for admission. Pt/family is agreeable and understands need for follow up and repeat testing.  Pt is aware that discharge does not mean that nothing is wrong but it indicates no emergency is present that requires admission and they must continue care with follow-up as given below or physician of their choice.   Patient/Family voiced understanding of above instructions.  Patient discharged in stable condition.    DIAGNOSIS  Final diagnoses:   Fever and chills       FOLLOW UP   Fidel Flannery MD  [address] [facility]  [address]  [place] [address]  [phone]    Call in 1 day      Anel Moore MD  [address]  [place] [address]  [phone]            RX     Medication List      No changes were made to your prescriptions during this visit.           MEDICATIONS GIVEN IN ED    Medications   sodium chloride 0.9 % bolus 1,000 mL (0 mL Intravenous Stopped 9/21/22 1317)   acetaminophen (TYLENOL) tablet 1,000 mg (1,000 mg Oral Given 9/21/22 1123)           COURSE & MEDICAL DECISION MAKING  Any/All labs and Any/All Imaging studies that were ordered were reviewed and are noted above.  Results were reviewed/discussed with the patient and they were also made aware of online access.    Pt also made aware that some labs, such as cultures, will not be resulted during ER visit and followup with PMD is necessary.        Kylie Da Silva, APRN  09/21/22 5305

## 2022-09-21 NOTE — ED PROVIDER NOTES
MD ATTESTATION NOTE    The SONG and I have discussed this patient's history, physical exam, and treatment plan.  I have reviewed the documentation and personally had a face to face interaction with the patient. I affirm the documentation and agree with the treatment and plan.  The attached note describes my personal findings.    I provided a substantive portion of the care of this patient. I personally performed the physical exam, in its entirety.    History  27-year-old female who is 5 days postpartum presents with fever of 101.7.  She denies chest pain or trouble breathing.  No significant cough.  No breast redness or swelling.  Denies lower abdominal pain or dysuria.    Physical Exam  Vital Signs reviewed  GENERAL: Alert and pleasant female no obvious distress.  Patient is febrile  HENT: nares patent  EYES: no scleral icterus  CV: regular rhythm, regular rate  RESPIRATORY: normal effort, clear to auscultation bilaterally  ABDOMEN: soft, nontender to palpation  MUSCULOSKELETAL: no deformity  NEURO: Strength sensation and coordination are grossly intact.  Speech and mentation are unremarkable  SKIN: warm, dry      Disposition  I discussed treatment and evaluation this patient with NP Sabrina Da Silva.  Work-up fairly reassuring with benign white blood cell count.  Viral panel normal.  Chest x-ray normal.  Ultrasound unremarkable.  At this point I do not see worrisome signs of fever and will treat symptomatically.  Patient advised to return to the hospital for worsening pain, trouble breathing or as needed.       Dano Rincon MD  09/21/22 4251

## 2022-09-27 ENCOUNTER — TELEPHONE (OUTPATIENT)
Dept: OBSTETRICS AND GYNECOLOGY | Facility: CLINIC | Age: 27
End: 2022-09-27

## 2022-09-27 NOTE — TELEPHONE ENCOUNTER
Caller: GAVIN LINDSEY    Relationship: SELF    Best call back number: [phone]-CALL ANYTIME, IT IS OKAY TO LV.    What form or medical record are you requesting: [facility]    Who is requesting this form or medical record from you: PATIENT/EMPLOYER-[employer]    How would you like to receive the form or medical records (pick-up, mail, fax): FAX  If fax, what is the fax number: [phone]- ATT:CANDICE MCKEON    Timeframe paperwork needed: AS SOON AS POSSIBLE    Additional notes: PT IS FAXING [facility] PAPER WORK TO OFFICE FAX#[phone]  TODAY-09.27.22.

## 2022-09-30 ENCOUNTER — TELEPHONE (OUTPATIENT)
Dept: OBSTETRICS AND GYNECOLOGY | Facility: CLINIC | Age: 27
End: 2022-09-30

## 2022-09-30 NOTE — TELEPHONE ENCOUNTER
Caller: Jannette Moy    Relationship: Self    Best call back number: [phone]-CALL ANYTIME, IT IS OKAY TO LVM.    What form or medical record are you requesting: SHORT TERM DISABILITY-    Who is requesting this form or medical record from you: PT/PT EMPLOYER    How would you like to receive the form or medical records (pick-up, mail, fax): FAX    If fax, what is the fax number: PER PT ,RETURN FAX NUMBER IS LOCATED AT THE TOP OF [facility]-SHORT TERM DISABILITY PAPER WORK     Timeframe paperwork needed:AS SOON AS POSSIBLE    Additional notes:PT FAXED PAPER WORK ON 09.29.22-PT WOULD LIKE A CALL BACK TO CONFIRM OFFICE RECEIVED SHORT TERM DISABILITY PAPER WORK.     PROVIDER:Angelika Dick MD

## 2022-10-12 ENCOUNTER — TELEPHONE (OUTPATIENT)
Dept: OBSTETRICS AND GYNECOLOGY | Facility: CLINIC | Age: 27
End: 2022-10-12

## 2022-10-12 NOTE — TELEPHONE ENCOUNTER
Patient called to schedule postpartum, should I schedule her at soonest available which would be 11/9 or check with Annamaria?       please advise,   thank you

## 2022-10-14 ENCOUNTER — TELEPHONE (OUTPATIENT)
Dept: OBSTETRICS AND GYNECOLOGY | Facility: CLINIC | Age: 27
End: 2022-10-14

## 2022-10-14 RX ORDER — ERYTHROMYCIN 500 MG/1
500 TABLET, COATED ORAL 2 TIMES DAILY
Qty: 14 TABLET | Refills: 0 | Status: SHIPPED | OUTPATIENT
Start: 2022-10-14 | End: 2022-10-21

## 2022-10-14 NOTE — TELEPHONE ENCOUNTER
Patient has mastitis in her left breast, so she is requesting an antibiotic for that.     Also, because of the mastitis, her milk production has decreased in the left breast. Can she get a supplement prescribed to help increase her milk supply?    Please advise,   Thank you     Pharmacy confirmed - [id number] - [place], [place] - [address] - [phone] [facility] [phone] FX

## 2022-10-31 ENCOUNTER — POSTPARTUM VISIT (OUTPATIENT)
Dept: OBSTETRICS AND GYNECOLOGY | Facility: CLINIC | Age: 27
End: 2022-10-31

## 2022-10-31 VITALS
DIASTOLIC BLOOD PRESSURE: 67 MMHG | SYSTOLIC BLOOD PRESSURE: 114 MMHG | WEIGHT: 175.6 LBS | HEIGHT: 62 IN | BODY MASS INDEX: 32.31 KG/M2 | HEART RATE: 85 BPM

## 2022-10-31 DIAGNOSIS — Z30.011 ENCOUNTER FOR INITIAL PRESCRIPTION OF CONTRACEPTIVE PILLS: ICD-10-CM

## 2022-10-31 PROCEDURE — 0503F POSTPARTUM CARE VISIT: CPT | Performed by: NURSE PRACTITIONER

## 2022-10-31 RX ORDER — ESTRADIOL 0.1 MG/G
2 CREAM VAGINAL NIGHTLY
Qty: 42.5 G | Refills: 0 | Status: SHIPPED | OUTPATIENT
Start: 2022-10-31 | End: 2022-11-10

## 2022-10-31 RX ORDER — ACETAMINOPHEN AND CODEINE PHOSPHATE 120; 12 MG/5ML; MG/5ML
1 SOLUTION ORAL DAILY
Qty: 90 TABLET | Refills: 3 | Status: SHIPPED | OUTPATIENT
Start: 2022-10-31 | End: 2023-10-31

## 2022-10-31 NOTE — PROGRESS NOTES
"Chief Complaint   Patient presents with   • Postpartum Care     Pt presents today for 6 week pp visit         SUBJECTIVE:   Jannette Moy is a 27 y.o.  who presents s/p  Spontaneous Vaginal Delivery on 22. She has returned for menses since delivery. She did have issue with mastitis in the early postpartum period, with improvement following antibiotics.  Denies history of migraine with aura, denies history of DVT, there is no family history of DVT. She is a nonsmoker.    Bowel and bladder function have returned to normal  Mood changes denies  breast feeding    History reviewed. No pertinent past medical history.  Past Surgical History:   Procedure Laterality Date   • TONSILLECTOMY     • WISDOM TOOTH EXTRACTION       Social History     Tobacco Use   • Smoking status: Never   • Smokeless tobacco: Never   Vaping Use   • Vaping Use: Never used   Substance Use Topics   • Alcohol use: Never   • Drug use: Never     History reviewed. No pertinent family history.    Patient Active Problem List   Diagnosis   • Pregnancy        OBJECTIVE:   /67   Pulse 85   Ht 157.5 cm (62.01\")   Wt 79.7 kg (175 lb 9.6 oz)   LMP 2021   Breastfeeding Yes   BMI 32.11 kg/m²        Physical Exam  Constitutional:       General: She is not in acute distress.     Appearance: Normal appearance. She is not ill-appearing, toxic-appearing or diaphoretic.   Genitourinary:      Bladder and urethral meatus normal.      No lesions in the vagina.      Right Labia: No rash, tenderness, lesions, skin changes or Bartholin's cyst.     Left Labia: No tenderness, lesions, skin changes, Bartholin's cyst or rash.     No labial fusion noted.            No inguinal adenopathy present in the right or left side.     No vaginal discharge, erythema, tenderness, bleeding, ulceration or granulation tissue.      No vaginal prolapse present.     No vaginal atrophy present.       Right Adnexa: not tender, not full, not palpable, no mass present and " not absent.     Left Adnexa: not tender, not full, not palpable, no mass present and not absent.     No cervical motion tenderness, discharge, friability, lesion, polyp, nabothian cyst or eversion.      Uterus is not enlarged, fixed, tender, irregular or prolapsed.      No uterine mass detected.  Cardiovascular:      Rate and Rhythm: Normal rate.   Pulmonary:      Effort: Pulmonary effort is normal.   Abdominal:      General: There is no distension.      Palpations: Abdomen is soft. There is no mass.      Tenderness: There is no abdominal tenderness. There is no guarding.      Hernia: No hernia is present. There is no hernia in the left inguinal area or right inguinal area.   Musculoskeletal:         General: Normal range of motion.      Cervical back: Normal range of motion.   Lymphadenopathy:      Lower Body: No right inguinal adenopathy. No left inguinal adenopathy.   Neurological:      General: No focal deficit present.      Mental Status: She is alert and oriented to person, place, and time.      Cranial Nerves: No cranial nerve deficit.   Skin:     General: Skin is warm and dry.   Psychiatric:         Mood and Affect: Mood normal.         Behavior: Behavior normal.         Thought Content: Thought content normal.         Judgment: Judgment normal.   Vitals and nursing note reviewed.         Lab Results   Component Value Date    WBC 10.36 2022    HGB 13.7 2022    HCT 41.1 2022    MCV 90.7 2022     2022        Diagnoses and all orders for this visit:    1. Postpartum follow-up (Primary)    2. Encounter for initial prescription of contraceptive pills  -     norethindrone (MICRONOR) 0.35 MG tablet; Take 1 tablet by mouth Daily.  Dispense: 90 tablet; Refill: 3    3.  (spontaneous vaginal delivery)  -     estradiol (ESTRACE VAGINAL) 0.1 MG/GM vaginal cream; Insert 2 g into the vagina Every Night for 10 days.  Dispense: 42.5 g; Refill: 0         PLAN:   Doing very well  postpartum  Baby is doing well  Contraception: Discussed contraception options at length including pills, patch, vaginal ring, POPs,  injection, implant, and IUDs.  The risks and benefits of the methods were discussed including but not limited to the increased risk of heart attack, blood clot, and stroke.  It was discussed the contraception does not protect against sexually transmitted infections and condoms are encouraged. The patient desires to start CIERRA. Discussed start up, uses, side effects, risks vs benefits. Encouraged condoms for the first 3 weeks of use as back up.   At this time, may resume normal activity including intercourse and lifting.  Reviewed normal precautions.  Reviewed last pap smear 2/2/2022 NIL  Small amount of erythema and tenderness at introitus. Estrace sent to pharmacy.   Recommended follow up for annual exam or sooner with problems    Annamaria Roe, JAMEL  10/31/2022  14:22 EDT

## 2023-03-29 ENCOUNTER — OFFICE VISIT (OUTPATIENT)
Dept: OBSTETRICS AND GYNECOLOGY | Facility: CLINIC | Age: 28
End: 2023-03-29
Payer: COMMERCIAL

## 2023-03-29 VITALS
HEART RATE: 67 BPM | SYSTOLIC BLOOD PRESSURE: 112 MMHG | HEIGHT: 62 IN | DIASTOLIC BLOOD PRESSURE: 76 MMHG | WEIGHT: 178 LBS | BODY MASS INDEX: 32.76 KG/M2

## 2023-03-29 DIAGNOSIS — N90.7 VULVAR CYST: Primary | ICD-10-CM

## 2023-03-29 NOTE — PROGRESS NOTES
"Chief Complaint  Vaginal Pain- Pt had 2nd degree tear after birth and is having bleeding after intercourse due to tear spot.     Subjective        Jannette Moy presents to [facility]  History of Present Illness  Patient is a 27-year-old who is 6 months status post vaginal delivery at term.  She did have a second-degree laceration and did have some pain at the area at her 6-week postpartum visit and was given topical estrogen.  She states that she did use it for a period of time and felt that it helped and the pain had resolved.  She recently had an episode where she had intercourse with her  and had pain at the area and then noticed bleeding afterwards.  She states that she has had recent episodes of intercourse where she did not have any issues.  Objective   Vital Signs:  /76   Pulse 67   Ht 157.5 cm (62\")   Wt 80.7 kg (178 lb)   BMI 32.56 kg/m²   Estimated body mass index is 32.56 kg/m² as calculated from the following:    Height as of this encounter: 157.5 cm (62\").    Weight as of this encounter: 80.7 kg (178 lb).             Physical Exam  Vitals reviewed. Exam conducted with a chaperone present.   Constitutional:       Appearance: She is well-developed.   Genitourinary:     Labia:         Right: No rash, tenderness, lesion or injury.         Left: No rash, tenderness, lesion or injury.       Vagina: Normal.       Neurological:      Mental Status: She is alert.   Psychiatric:         Behavior: Behavior normal.        Result Review :            Destruction of Lesion    Date/Time: 3/29/2023 3:57 PM  Performed by: Angelika Dick MD  Authorized by: Angelika Dick MD   Preparation: Patient was prepped and draped in the usual sterile fashion.  Local anesthesia used: yes  Anesthesia: local infiltration    Anesthesia:  Local anesthesia used: yes  Local Anesthetic: lidocaine 1% with epinephrine  Anesthetic total: 2 mL    Sedation:  Patient sedated: " no    Patient tolerance: patient tolerated the procedure well with no immediate complications  Comments: Procedure: Drainage and excision of vulvar cyst    Preprocedure diagnosis: Vulvar cyst    Post procedure diagnosis: Same    Procedure: Patient was placed in the dorsal lithotomy position on the exam table and the area was cleansed with Betadine.  2 mL of 1% lidocaine with epinephrine was injected under the cyst.  A knife was used to open up the cyst and it drained for clear fluid.  The remaining cyst wall was excised with scissors and the area was made hemostatic with silver nitrate and pressure.  EBL was minimal and she tolerated the procedure well with no complications.  She was advised to be on pelvic rest for 1 week to allow the area to heal.            Assessment and Plan   Diagnoses and all orders for this visit:    1. Vulvar cyst (Primary)  -     Reference Histopathology  -     Destruction of Lesion    Patient was noted to have a less than 1 cm cyst at the vaginal introitus.  She was offered expectant management with warm compresses or drainage and excision of the cyst in the office and she wished to proceed with drainage and excision.  Patient was advised to follow-up if symptoms return.         Follow Up   No follow-ups on file.  Patient was given instructions and counseling regarding her condition or for health maintenance advice. Please see specific information pulled into the AVS if appropriate.

## 2023-04-03 LAB
DX ICD CODE: NORMAL
PATH REPORT.FINAL DX SPEC: NORMAL
PATH REPORT.GROSS SPEC: NORMAL
PATH REPORT.SITE OF ORIGIN SPEC: NORMAL
PATHOLOGIST NAME: NORMAL
PAYMENT PROCEDURE: NORMAL

## 2023-04-05 ENCOUNTER — TELEPHONE (OUTPATIENT)
Dept: OBSTETRICS AND GYNECOLOGY | Facility: CLINIC | Age: 28
End: 2023-04-05
Payer: COMMERCIAL

## 2023-04-05 NOTE — TELEPHONE ENCOUNTER
----- Message from Marlon Lopez MD sent at 4/4/2023  5:40 PM EDT -----  Cesario please let this patient know that her vulvar cyst was totally benign.  Thank you.

## 2023-08-08 DIAGNOSIS — Z30.011 ENCOUNTER FOR INITIAL PRESCRIPTION OF CONTRACEPTIVE PILLS: ICD-10-CM

## 2023-08-08 RX ORDER — NORETHINDRONE 0.35 MG/1
TABLET ORAL
Qty: 28 TABLET | Refills: 3 | Status: SHIPPED | OUTPATIENT
Start: 2023-08-08

## 2023-09-18 ENCOUNTER — TELEPHONE (OUTPATIENT)
Dept: OBSTETRICS AND GYNECOLOGY | Facility: CLINIC | Age: 28
End: 2023-09-18
Payer: COMMERCIAL

## 2023-09-18 RX ORDER — DROSPIRENONE AND ETHINYL ESTRADIOL 0.03MG-3MG
1 KIT ORAL DAILY
Qty: 84 TABLET | Refills: 0 | Status: SHIPPED | OUTPATIENT
Start: 2023-09-18 | End: 2024-09-17

## 2023-09-18 NOTE — TELEPHONE ENCOUNTER
HUB call. PP 10//31/22, Vulvar cyst 3/29/23. Wants to switch from Jencycla to another oral birth control. States her periods are heavy and thinks it is because of the birth control. [facility] pharmacy. Thank you

## 2023-09-18 NOTE — TELEPHONE ENCOUNTER
Spoke with Jannette first to find out she is no longer breastfeeding.    Spoke to Jannette to let her know that Dr Dick sent in new OCP to [facility].

## 2023-09-18 NOTE — TELEPHONE ENCOUNTER
Caller: Jannette Moy    Relationship to patient: Self    Best call back number: [phone]    PT IS CURRENTLY TAKING norethindrone (Jencycla) 0.35 MG tablet [[id number]] (Order [id number])  AND IS WANTING TO SWITCH TO ANOTHER BIRTH CONTROL ( ORAL) . HER PERIODS ARE HEAVY AND SHE THINKS IT IS BECAUSE OF THE BIRTHCONTROL SHE'S CURRENTLY ON. PLEASE ADVISE PT AT NUMBER ABOVE.

## 2023-11-22 RX ORDER — DROSPIRENONE AND ETHINYL ESTRADIOL 0.03MG-3MG
1 KIT ORAL DAILY
Qty: 84 TABLET | Refills: 1 | Status: SHIPPED | OUTPATIENT
Start: 2023-11-22

## 2023-11-22 NOTE — TELEPHONE ENCOUNTER
Med refill. PP 10/31/22, Last seen vulvar cyst 3/29/23. Scheduled AE 2/13/24. [facility]. Thank you.

## 2023-12-14 RX ORDER — DROSPIRENONE AND ETHINYL ESTRADIOL 0.03MG-3MG
1 KIT ORAL DAILY
Qty: 84 TABLET | Refills: 0 | Status: SHIPPED | OUTPATIENT
Start: 2023-12-14

## 2023-12-14 NOTE — TELEPHONE ENCOUNTER
Med refill. Mayelin pt. PP 10/31/22, Scheduled 2/13/24 AE. On 11/22/23 84 tab with 1 refill. [facility]. Thank you

## 2024-02-18 NOTE — PROGRESS NOTES
"GYN ANNUAL EXAM     Chief Complaint   Patient presents with    Annual Exam     AE and last pap  normal       HPI    Jannette is a 28 y.o. female who presents for annual well woman exam. She is a patient of Dr. Dick. She is pleased with her Lauren contraceptive pills currently.     OB History          1    Para   1    Term   1            AB        Living   1         SAB        IAB        Ectopic        Molar        Multiple   0    Live Births   1                SUBJECTIVE    MENSTRUAL & SEXUAL HEALTH  LMP: Patient's last menstrual period was 2024.  Currently sexually active: is  Sexual preference: men  Menses regularity: regular every 28-30 days  Menses length: 4 days  Dysmenorrhea: none  Cyclic symptoms: none  Current contraception: OCP (estrogen/progesterone)  Last pap: ,  NIL  History of abnormal pap: no  History of STD: No  Family history of cancer: denies       Family history of breast cancer: no  Performs SBE: performs monthly.  Incontinence: no  Dyspareunia: no    History reviewed. No pertinent past medical history.    Past Surgical History:   Procedure Laterality Date    TONSILLECTOMY      WISDOM TOOTH EXTRACTION           Current Outpatient Medications:     drospirenone-ethinyl estradiol (Wanda 28) 3-0.03 MG per tablet, Take 1 tablet by mouth Daily., Disp: 84 tablet, Rfl: 3    Allergies   Allergen Reactions    Penicillins Rash    Raspberry Rash       Social History     Tobacco Use    Smoking status: Never    Smokeless tobacco: Never   Vaping Use    Vaping Use: Never used   Substance Use Topics    Alcohol use: Never    Drug use: Never       History reviewed. No pertinent family history.    Review of Systems   All other systems reviewed and are negative.      OBJECTIVE    Ht 157.5 cm (62.01\")   Wt 83.5 kg (184 lb)   LMP 2024   Breastfeeding No   BMI 33.65 kg/m²     Physical Exam  Constitutional:       General: She is awake. She is not in acute distress.     Appearance: " Normal appearance. She is well-developed and well-groomed. She is not ill-appearing.   Genitourinary:      Vulva, bladder and urethral meatus normal.      Right Labia: No rash, tenderness, lesions or skin changes.     Left Labia: No tenderness, lesions, skin changes or rash.     No labial fusion noted.      No inguinal adenopathy present in the right or left side.     Vaginal discharge (thick, white) present.      No vaginal erythema, tenderness, bleeding, ulceration or granulation tissue.      No vaginal prolapse present.     No vaginal atrophy present.     Cervical discharge present.      No cervical friability, lesion, polyp, eversion or elongation.      Uterus is not enlarged, tender or prolapsed.      Pelvic exam was performed with patient in the lithotomy position.   Breasts:     Breasts are symmetrical.      Breasts are soft.     Right: Normal.      Left: Normal.   HENT:      Head: Normocephalic and atraumatic.   Eyes:      General: No scleral icterus.     Conjunctiva/sclera: Conjunctivae normal.   Cardiovascular:      Rate and Rhythm: Normal rate and regular rhythm.      Heart sounds: Normal heart sounds.   Pulmonary:      Effort: Pulmonary effort is normal.      Breath sounds: Normal breath sounds.   Abdominal:      Palpations: Abdomen is soft.      Hernia: There is no hernia in the left inguinal area or right inguinal area.   Musculoskeletal:         General: Normal range of motion.      Cervical back: Normal range of motion.   Lymphadenopathy:      Lower Body: No right inguinal adenopathy. No left inguinal adenopathy.   Neurological:      Mental Status: She is alert.   Skin:     General: Skin is warm and dry.      Coloration: Skin is not jaundiced or pale.   Psychiatric:         Behavior: Behavior normal. Behavior is cooperative.   Vitals reviewed.         ASSESSMENT     Diagnoses and all orders for this visit:    1. Encounter for gynecological examination without abnormal finding (Primary)  -     IGP,  Apt HPV,rfx 16 / 18,45    2. Encounter for screening for malignant neoplasm of vagina  -     IGP, Apt HPV,rfx 16 / 18,45    3. Screening for malignant neoplasm of cervix  -     IGP, Apt HPV,rfx 16 / 18,45    4. Encounter for screening examination for sexually transmitted disease  -     IGP, Apt HPV,rfx 16 / 18,45    5. Encounter for breast cancer screening using non-mammogram modality    6. Encounter for surveillance of contraceptive pills  -     drospirenone-ethinyl estradiol (Wanda 28) 3-0.03 MG per tablet; Take 1 tablet by mouth Daily.  Dispense: 84 tablet; Refill: 3    7. Vaginal discharge  -     NuSwab VG+ - Swab, Vagina         PLAN   WELL WOMAN EXAM: Pap smear collected today. Recommend MVI daily.    CONTRACEPTION: OCP (estrogen/progesterone) refilled x 1 year - ACHES discussed. .   STD: STD screen today- desires - NuSwab., encouraged use of condoms.  VAGINAL DISCHARGE: culture collected.   SMOKING STATUS: non smoker.  BREAST HEALTH: Encouraged annual mammogram screening starting at age 40. Instructed on how to perform SBE. Encouraged breast health self awareness.  EXERCISE: Encouraged 150 minutes of exercise per week if not medially contraindicated.   BMI: Body mass index is 33.65 kg/m².     Return in about 1 year (around 2/20/2025) for annual physical.    I spent 15 minutes caring for Jannette on this date of service. This time includes time spent by me in the following activities: preparing for the visit, reviewing tests, obtaining and/or reviewing a separately obtained history, performing a medically appropriate examination and/or evaluation, counseling and educating the patient/family/caregiver, ordering medications, tests, or procedures, referring and communicating with other health care professionals, documenting information in the medical record, independently interpreting results and communicating that information with the patient/family/caregiver, and care coordination.    Sharon Barahona,  NAVDEEP  2/21/2024  19:53 EST

## 2024-02-20 ENCOUNTER — OFFICE VISIT (OUTPATIENT)
Dept: OBSTETRICS AND GYNECOLOGY | Facility: CLINIC | Age: 29
End: 2024-02-20
Payer: COMMERCIAL

## 2024-02-20 VITALS — HEIGHT: 62 IN | BODY MASS INDEX: 33.86 KG/M2 | WEIGHT: 184 LBS

## 2024-02-20 DIAGNOSIS — Z12.39 ENCOUNTER FOR BREAST CANCER SCREENING USING NON-MAMMOGRAM MODALITY: ICD-10-CM

## 2024-02-20 DIAGNOSIS — Z12.4 SCREENING FOR MALIGNANT NEOPLASM OF CERVIX: ICD-10-CM

## 2024-02-20 DIAGNOSIS — N89.8 VAGINAL DISCHARGE: ICD-10-CM

## 2024-02-20 DIAGNOSIS — Z01.419 ENCOUNTER FOR GYNECOLOGICAL EXAMINATION WITHOUT ABNORMAL FINDING: Primary | ICD-10-CM

## 2024-02-20 DIAGNOSIS — Z30.41 ENCOUNTER FOR SURVEILLANCE OF CONTRACEPTIVE PILLS: ICD-10-CM

## 2024-02-20 DIAGNOSIS — Z11.3 ENCOUNTER FOR SCREENING EXAMINATION FOR SEXUALLY TRANSMITTED DISEASE: ICD-10-CM

## 2024-02-20 DIAGNOSIS — Z12.72 ENCOUNTER FOR SCREENING FOR MALIGNANT NEOPLASM OF VAGINA: ICD-10-CM

## 2024-02-20 RX ORDER — DROSPIRENONE AND ETHINYL ESTRADIOL 0.03MG-3MG
1 KIT ORAL DAILY
Qty: 84 TABLET | Refills: 3 | Status: SHIPPED | OUTPATIENT
Start: 2024-02-20 | End: 2025-02-19

## 2024-02-23 LAB
A VAGINAE DNA VAG QL NAA+PROBE: ABNORMAL SCORE
BVAB2 DNA VAG QL NAA+PROBE: ABNORMAL SCORE
C ALBICANS DNA VAG QL NAA+PROBE: NEGATIVE
C GLABRATA DNA VAG QL NAA+PROBE: NEGATIVE
C TRACH DNA VAG QL NAA+PROBE: NEGATIVE
CYTOLOGIST CVX/VAG CYTO: NORMAL
CYTOLOGY CVX/VAG DOC CYTO: NORMAL
CYTOLOGY CVX/VAG DOC THIN PREP: NORMAL
DX ICD CODE: NORMAL
HIV 1 & 2 AB SER-IMP: NORMAL
HPV I/H RISK 4 DNA CVX QL PROBE+SIG AMP: NEGATIVE
MEGA1 DNA VAG QL NAA+PROBE: ABNORMAL SCORE
N GONORRHOEA DNA VAG QL NAA+PROBE: NEGATIVE
OTHER STN SPEC: NORMAL
STAT OF ADQ CVX/VAG CYTO-IMP: NORMAL
T VAGINALIS DNA VAG QL NAA+PROBE: NEGATIVE

## 2024-02-23 RX ORDER — METRONIDAZOLE 500 MG/1
500 TABLET ORAL 2 TIMES DAILY
Qty: 14 TABLET | Refills: 0 | Status: SHIPPED | OUTPATIENT
Start: 2024-02-23 | End: 2024-03-01

## 2024-08-12 ENCOUNTER — TRANSCRIBE ORDERS (OUTPATIENT)
Dept: ADMINISTRATIVE | Facility: HOSPITAL | Age: 29
End: 2024-08-12
Payer: COMMERCIAL

## 2024-08-12 DIAGNOSIS — R10.30 LOWER ABDOMINAL PAIN: Primary | ICD-10-CM

## 2024-08-20 ENCOUNTER — HOSPITAL ENCOUNTER (OUTPATIENT)
Dept: CT IMAGING | Facility: HOSPITAL | Age: 29
Discharge: HOME | End: 2024-08-20
Admitting: INTERNAL MEDICINE
Payer: COMMERCIAL

## 2024-08-20 DIAGNOSIS — R10.30 LOWER ABDOMINAL PAIN: ICD-10-CM

## 2024-08-20 PROCEDURE — 74177 CT ABD & PELVIS W/CONTRAST: CPT

## 2024-08-20 PROCEDURE — 0 DIATRIZOATE MEGLUMINE & SODIUM PER 1 ML: Performed by: INTERNAL MEDICINE

## 2024-08-20 PROCEDURE — 25510000001 IOPAMIDOL 61 % SOLUTION: Performed by: INTERNAL MEDICINE

## 2024-08-20 RX ADMIN — DIATRIZOATE MEGLUMINE AND DIATRIZOATE SODIUM 30 ML: 660; 100 LIQUID ORAL; RECTAL at 10:30

## 2024-08-20 RX ADMIN — IOPAMIDOL 85 ML: 612 INJECTION, SOLUTION INTRAVENOUS at 11:48

## 2024-10-29 ENCOUNTER — INITIAL PRENATAL (OUTPATIENT)
Dept: OBSTETRICS AND GYNECOLOGY | Facility: CLINIC | Age: 29
End: 2024-10-29
Payer: COMMERCIAL

## 2024-10-29 VITALS — BODY MASS INDEX: 33.35 KG/M2 | WEIGHT: 182.4 LBS | SYSTOLIC BLOOD PRESSURE: 108 MMHG | DIASTOLIC BLOOD PRESSURE: 75 MMHG

## 2024-10-29 DIAGNOSIS — Z13.89 SCREENING FOR BLOOD OR PROTEIN IN URINE: ICD-10-CM

## 2024-10-29 DIAGNOSIS — Z34.90 EARLY STAGE OF PREGNANCY: Primary | ICD-10-CM

## 2024-10-29 DIAGNOSIS — Z34.90 PREGNANCY WITH UNCERTAIN DATES, ANTEPARTUM: ICD-10-CM

## 2024-10-29 LAB
B-HCG UR QL: POSITIVE
EXPIRATION DATE: ABNORMAL
GLUCOSE UR STRIP-MCNC: NEGATIVE MG/DL
INTERNAL NEGATIVE CONTROL: NEGATIVE
INTERNAL POSITIVE CONTROL: POSITIVE
Lab: ABNORMAL
PROT UR STRIP-MCNC: NEGATIVE MG/DL

## 2024-10-29 RX ORDER — PRENATAL VIT/IRON FUM/FOLIC AC 27MG-0.8MG
TABLET ORAL DAILY
COMMUNITY

## 2024-10-29 NOTE — PROGRESS NOTES
Chief Complaint   Patient presents with    Initial Prenatal Visit     HPI- Pt is 29 y.o.  at 8w6d here for prenatal visit.  Patient presents for initial OB visit.  She states that she is sure regarding her LMP.  She has a history of 1 previous pregnancy and had a normal pregnancy and normal vaginal delivery at term.  She had no complications.  She has no complaints today and is taking prenatal vitamins.  She is denying any issues with nausea or vomiting.    ROS-     - No vaginal bleeding    GI- No abdominal pain    /75   Wt 82.7 kg (182 lb 6.4 oz)   LMP 2024 (Within Days)   BMI 33.35 kg/m²   Exam - See flow sheet    Fetal heart rate is normal    Assessment-  Diagnoses and all orders for this visit:    Early stage of pregnancy  -     OB Panel With HIV and Treponema Palldium Ab  -     Urine Culture - Urine, Urine, Clean Catch  -     Drug Profile Urine - 9 Drugs - Urine, Clean Catch  -     Chlamydia trachomatis, Neisseria gonorrhoeae, Trichomonas vaginalis, PCR - Swab, Vagina    Pregnancy with uncertain dates, antepartum  -     US Ob Transvaginal; Future    Other orders  -     Prenatal Vit-Fe Fumarate-FA (prenatal vitamin 27-0.8) 27-0.8 MG tablet tablet; Take  by mouth Daily.      Initial OB labs and ultrasound were ordered.  Discussed NIPT testing and she declines.  She will follow-up in 4 weeks.

## 2024-10-30 LAB
AMPHETAMINES UR QL SCN: NEGATIVE NG/ML
BARBITURATES UR QL SCN: NEGATIVE NG/ML
BENZODIAZ UR QL: NEGATIVE NG/ML
BZE UR QL: NEGATIVE NG/ML
CANNABINOIDS UR QL SCN: NEGATIVE NG/ML
METHADONE UR QL SCN: NEGATIVE NG/ML
OPIATES UR QL: NEGATIVE NG/ML
PCP UR QL SCN: NEGATIVE NG/ML
PROPOXYPH UR QL SCN: NEGATIVE NG/ML

## 2024-10-31 LAB
BACTERIA UR CULT: NORMAL
BACTERIA UR CULT: NORMAL
C TRACH RRNA SPEC QL NAA+PROBE: NEGATIVE
N GONORRHOEA RRNA SPEC QL NAA+PROBE: NEGATIVE
T VAGINALIS RRNA SPEC QL NAA+PROBE: NEGATIVE

## 2024-11-01 LAB
ABO GROUP BLD: NORMAL
BASOPHILS # BLD AUTO: 0 X10E3/UL (ref 0–0.2)
BASOPHILS NFR BLD AUTO: 1 %
BLD GP AB SCN SERPL QL: NEGATIVE
EOSINOPHIL # BLD AUTO: 0.1 X10E3/UL (ref 0–0.4)
EOSINOPHIL NFR BLD AUTO: 1 %
ERYTHROCYTE [DISTWIDTH] IN BLOOD BY AUTOMATED COUNT: 12.3 % (ref 11.7–15.4)
HBV SURFACE AG SERPL QL IA: NEGATIVE
HCT VFR BLD AUTO: 38.5 % (ref 34–46.6)
HCV AB SERPL QL IA: NORMAL
HCV IGG SERPL QL IA: NON REACTIVE
HGB BLD-MCNC: 13 G/DL (ref 11.1–15.9)
HIV 1+2 AB+HIV1 P24 AG SERPL QL IA: NON REACTIVE
IMM GRANULOCYTES # BLD AUTO: 0.1 X10E3/UL (ref 0–0.1)
IMM GRANULOCYTES NFR BLD AUTO: 1 %
LYMPHOCYTES # BLD AUTO: 2.5 X10E3/UL (ref 0.7–3.1)
LYMPHOCYTES NFR BLD AUTO: 32 %
MCH RBC QN AUTO: 29.9 PG (ref 26.6–33)
MCHC RBC AUTO-ENTMCNC: 33.8 G/DL (ref 31.5–35.7)
MCV RBC AUTO: 89 FL (ref 79–97)
MONOCYTES # BLD AUTO: 0.6 X10E3/UL (ref 0.1–0.9)
MONOCYTES NFR BLD AUTO: 8 %
NEUTROPHILS # BLD AUTO: 4.4 X10E3/UL (ref 1.4–7)
NEUTROPHILS NFR BLD AUTO: 57 %
PLATELET # BLD AUTO: 270 X10E3/UL (ref 150–450)
RBC # BLD AUTO: 4.35 X10E6/UL (ref 3.77–5.28)
RH BLD: POSITIVE
RUBV IGG SERPL IA-ACNC: 27.1 INDEX
TREPONEMA PALLIDUM IGG+IGM AB [PRESENCE] IN SERUM OR PLASMA BY IMMUNOASSAY: NON REACTIVE
WBC # BLD AUTO: 7.7 X10E3/UL (ref 3.4–10.8)

## 2024-11-27 ENCOUNTER — ROUTINE PRENATAL (OUTPATIENT)
Dept: OBSTETRICS AND GYNECOLOGY | Facility: CLINIC | Age: 29
End: 2024-11-27
Payer: COMMERCIAL

## 2024-11-27 VITALS — SYSTOLIC BLOOD PRESSURE: 122 MMHG | DIASTOLIC BLOOD PRESSURE: 79 MMHG | WEIGHT: 185 LBS | BODY MASS INDEX: 33.83 KG/M2

## 2024-11-27 DIAGNOSIS — Z36.89 ENCOUNTER FOR FETAL ANATOMIC SURVEY: ICD-10-CM

## 2024-11-27 DIAGNOSIS — Z13.89 SCREENING FOR BLOOD OR PROTEIN IN URINE: ICD-10-CM

## 2024-11-27 DIAGNOSIS — Z36.86 ENCOUNTER FOR ANTENATAL SCREENING FOR CERVICAL LENGTH: ICD-10-CM

## 2024-11-27 DIAGNOSIS — Z34.82 ENCOUNTER FOR SUPERVISION OF OTHER NORMAL PREGNANCY IN SECOND TRIMESTER: Primary | ICD-10-CM

## 2024-11-27 LAB
GLUCOSE UR STRIP-MCNC: NEGATIVE MG/DL
PROT UR STRIP-MCNC: NEGATIVE MG/DL

## 2024-11-27 NOTE — PROGRESS NOTES
Chief Complaint   Patient presents with    Routine Prenatal Visit     HPI- Pt is 29 y.o.  at 14w6d here for prenatal visit.  She is doing well with no major concerns.  She denies any vaginal bleeding.    ROS-     - No vaginal bleeding    GI- No abdominal pain    /79   Wt 83.9 kg (185 lb)   LMP 2024 (Within Days)   BMI 33.83 kg/m²   Exam - See flow sheet    Fetal heart rate is normal    Assessment-  Diagnoses and all orders for this visit:    Encounter for supervision of other normal pregnancy in second trimester    Encounter for fetal anatomic survey  -     US Ob 14 + Weeks Single or First Gestation; Future    Encounter for  screening for cervical length  -     US Ob Transvaginal; Future    Ultrasound today shows that the subchorionic hematoma has resolved.  Ultrasound was reviewed with her.  She declines genetic screening.  She will follow-up in 5 weeks with anatomy ultrasound.

## 2025-01-03 ENCOUNTER — ROUTINE PRENATAL (OUTPATIENT)
Dept: OBSTETRICS AND GYNECOLOGY | Facility: CLINIC | Age: 30
End: 2025-01-03
Payer: COMMERCIAL

## 2025-01-03 VITALS — DIASTOLIC BLOOD PRESSURE: 72 MMHG | SYSTOLIC BLOOD PRESSURE: 112 MMHG | WEIGHT: 190.4 LBS | BODY MASS INDEX: 34.82 KG/M2

## 2025-01-03 DIAGNOSIS — Z36.2 ENCOUNTER FOR FOLLOW-UP ULTRASOUND OF FETAL ANATOMY: ICD-10-CM

## 2025-01-03 DIAGNOSIS — Z13.89 SCREENING FOR BLOOD OR PROTEIN IN URINE: ICD-10-CM

## 2025-01-03 DIAGNOSIS — Z3A.20 20 WEEKS GESTATION OF PREGNANCY: ICD-10-CM

## 2025-01-03 LAB
GLUCOSE UR STRIP-MCNC: NEGATIVE MG/DL
PROT UR STRIP-MCNC: NEGATIVE MG/DL

## 2025-01-03 NOTE — PROGRESS NOTES
Chief Complaint   Patient presents with    Routine Prenatal Visit     HPI- Pt is 29 y.o.  at 20w1d here for prenatal visit.  Patient is doing well today with no major complaints.  She has started to feel some fetal movement.    ROS-     - No vaginal bleeding    GI- No abdominal pain    /72   Wt 86.4 kg (190 lb 6.4 oz)   LMP 2024 (Within Days)   BMI 34.82 kg/m²   Exam - See flow sheet    Fetal heart rate is normal    Assessment-  Diagnoses and all orders for this visit:    Placenta succenturiate lobe affecting fetus    Encounter for follow-up ultrasound of fetal anatomy  -     US Ob Follow Up Transabdominal Approach; Future    20 weeks gestation of pregnancy    Fetal heart and diaphragm were not well-visualized on ultrasound today.  She will follow-up in 4 weeks with repeat ultrasound.  Also discussed with her that a succenturiate placental lobe was noted on ultrasound and and discussed recommendations for  testing starting at 32 weeks with this finding.

## 2025-01-28 ENCOUNTER — ROUTINE PRENATAL (OUTPATIENT)
Dept: OBSTETRICS AND GYNECOLOGY | Facility: CLINIC | Age: 30
End: 2025-01-28
Payer: COMMERCIAL

## 2025-01-28 VITALS — BODY MASS INDEX: 35.14 KG/M2 | SYSTOLIC BLOOD PRESSURE: 112 MMHG | DIASTOLIC BLOOD PRESSURE: 71 MMHG | WEIGHT: 192.2 LBS

## 2025-01-28 DIAGNOSIS — Z3A.23 23 WEEKS GESTATION OF PREGNANCY: ICD-10-CM

## 2025-01-28 DIAGNOSIS — Z13.89 SCREENING FOR BLOOD OR PROTEIN IN URINE: ICD-10-CM

## 2025-01-28 DIAGNOSIS — Z11.3 SCREEN FOR STD (SEXUALLY TRANSMITTED DISEASE): ICD-10-CM

## 2025-01-28 DIAGNOSIS — Z13.0 SCREENING FOR IRON DEFICIENCY ANEMIA: ICD-10-CM

## 2025-01-28 DIAGNOSIS — Z13.1 SCREENING FOR DIABETES MELLITUS: ICD-10-CM

## 2025-01-28 LAB
GLUCOSE UR STRIP-MCNC: NEGATIVE MG/DL
PROT UR STRIP-MCNC: NEGATIVE MG/DL

## 2025-01-28 NOTE — PROGRESS NOTES
Chief Complaint   Patient presents with    Routine Prenatal Visit     HPI- Pt is 29 y.o.  at 23w5d here for prenatal visit.  She is doing well with no concerns.  She feels good fetal movement.    ROS-     - No vaginal bleeding    GI- No abdominal pain    /71   Wt 87.2 kg (192 lb 3.2 oz)   LMP 2024 (Within Days)   BMI 35.14 kg/m²   Exam - See flow sheet    Fetal heart rate is normal    Assessment-  Diagnoses and all orders for this visit:    Placenta succenturiate lobe affecting fetus    23 weeks gestation of pregnancy    Screening for diabetes mellitus  -     Gestational Screen 1 Hr ([facility])    Screening for iron deficiency anemia  -     CBC (No Diff)    Screen for STD (sexually transmitted disease)  -     Treponema pallidum AB w/Reflex RPR    Follow-up anatomy ultrasound shows normal-appearing fetal anatomy.  Posterior succenturiate lobe is noted.  She will follow-up in 4 weeks and do her glucose test with that visit.  Instructions were discussed.

## 2025-02-21 ENCOUNTER — ROUTINE PRENATAL (OUTPATIENT)
Dept: OBSTETRICS AND GYNECOLOGY | Facility: CLINIC | Age: 30
End: 2025-02-21
Payer: COMMERCIAL

## 2025-02-21 VITALS — BODY MASS INDEX: 36.02 KG/M2 | SYSTOLIC BLOOD PRESSURE: 99 MMHG | WEIGHT: 197 LBS | DIASTOLIC BLOOD PRESSURE: 64 MMHG

## 2025-02-21 DIAGNOSIS — Z3A.27 27 WEEKS GESTATION OF PREGNANCY: ICD-10-CM

## 2025-02-21 DIAGNOSIS — Z13.89 SCREENING FOR BLOOD OR PROTEIN IN URINE: ICD-10-CM

## 2025-02-21 LAB
GLUCOSE UR STRIP-MCNC: NEGATIVE MG/DL
PROT UR STRIP-MCNC: NEGATIVE MG/DL

## 2025-02-21 NOTE — PROGRESS NOTES
Chief Complaint   Patient presents with    Routine Prenatal Visit     HPI- Pt is 29 y.o.  at 27w1d here for prenatal visit.  She is doing well and has no complaints.  She does feel good fetal movement.    ROS-     - No vaginal bleeding    GI- No abdominal pain    BP 99/64   Wt 89.4 kg (197 lb)   LMP 2024 (Within Days)   BMI 36.02 kg/m²   Exam - See flow sheet    Fetal heart rate is normal    Assessment-  Diagnoses and all orders for this visit:    Placenta succenturiate lobe affecting fetus    27 weeks gestation of pregnancy    Discussed fetal kick counts in the last trimester.  She is doing her glucose test today.  She will follow-up for next prenatal visit in 2 weeks.

## 2025-02-24 LAB
ERYTHROCYTE [DISTWIDTH] IN BLOOD BY AUTOMATED COUNT: 12.4 % (ref 12.3–15.4)
GLUCOSE 1H P 50 G GLC PO SERPL-MCNC: 88 MG/DL (ref 65–139)
HCT VFR BLD AUTO: 39.3 % (ref 34–46.6)
HGB BLD-MCNC: 12.6 G/DL (ref 12–15.9)
MCH RBC QN AUTO: 29.3 PG (ref 26.6–33)
MCHC RBC AUTO-ENTMCNC: 32.1 G/DL (ref 31.5–35.7)
MCV RBC AUTO: 91.4 FL (ref 79–97)
PLATELET # BLD AUTO: 186 10*3/MM3 (ref 140–450)
RBC # BLD AUTO: 4.3 10*6/MM3 (ref 3.77–5.28)
TREPONEMA PALLIDUM IGG+IGM AB [PRESENCE] IN SERUM OR PLASMA BY IMMUNOASSAY: NON REACTIVE
WBC # BLD AUTO: 12.98 10*3/MM3 (ref 3.4–10.8)

## 2025-03-04 ENCOUNTER — ROUTINE PRENATAL (OUTPATIENT)
Dept: OBSTETRICS AND GYNECOLOGY | Facility: CLINIC | Age: 30
End: 2025-03-04
Payer: COMMERCIAL

## 2025-03-04 VITALS — SYSTOLIC BLOOD PRESSURE: 100 MMHG | DIASTOLIC BLOOD PRESSURE: 66 MMHG | WEIGHT: 195 LBS | BODY MASS INDEX: 35.66 KG/M2

## 2025-03-04 DIAGNOSIS — Z3A.28 28 WEEKS GESTATION OF PREGNANCY: Primary | ICD-10-CM

## 2025-03-04 DIAGNOSIS — Z34.93 PRENATAL CARE IN THIRD TRIMESTER: ICD-10-CM

## 2025-03-04 LAB
GLUCOSE UR STRIP-MCNC: NEGATIVE MG/DL
PROT UR STRIP-MCNC: NEGATIVE MG/DL

## 2025-03-04 NOTE — PROGRESS NOTES
Chief Complaint   Patient presents with    Routine Prenatal Visit       OB follow up     Jannette Moy is a 29 y.o.  28w5d being seen today for her obstetrical visit.  Patient reports no complaints. Fetal movement: normal.    Review of Systems  Cramping/contractions: denies  Vaginal bleeding: denies  Fetal movement: normal    /66   Wt 88.5 kg (195 lb)   LMP 2024 (Within Days)   BMI 35.66 kg/m²   Prenatal Assessment  Fetal Heart Rate: 150  Movement: Present       Assessment/Plan    Diagnoses and all orders for this visit:    1. 28 weeks gestation of pregnancy (Primary)    2. Placenta succenturiate lobe affecting fetus    3. Prenatal care in third trimester    Other orders  -     Tdap Vaccine Greater Than or Equal To 6yo IM       Reviewed fetal kick counts  Reviewed S&S PTL  Tdap received today  Growth scan at next visit  Reviewed this stage of pregnancy  Problem list updated     Follow up in 2 week(s) with Dr. Mayelin Roe, APRN  3/4/2025  11:40 EST

## 2025-03-18 ENCOUNTER — ROUTINE PRENATAL (OUTPATIENT)
Dept: OBSTETRICS AND GYNECOLOGY | Facility: CLINIC | Age: 30
End: 2025-03-18
Payer: COMMERCIAL

## 2025-03-18 VITALS — WEIGHT: 198.4 LBS | DIASTOLIC BLOOD PRESSURE: 67 MMHG | BODY MASS INDEX: 36.28 KG/M2 | SYSTOLIC BLOOD PRESSURE: 98 MMHG

## 2025-03-18 DIAGNOSIS — Z13.89 SCREENING FOR BLOOD OR PROTEIN IN URINE: ICD-10-CM

## 2025-03-18 DIAGNOSIS — Z3A.30 30 WEEKS GESTATION OF PREGNANCY: ICD-10-CM

## 2025-03-18 LAB
GLUCOSE UR STRIP-MCNC: NEGATIVE MG/DL
PROT UR STRIP-MCNC: NEGATIVE MG/DL

## 2025-03-18 PROCEDURE — 0502F SUBSEQUENT PRENATAL CARE: CPT | Performed by: OBSTETRICS & GYNECOLOGY

## 2025-03-18 NOTE — PROGRESS NOTES
Chief Complaint   Patient presents with    Routine Prenatal Visit     HPI- Pt is 29 y.o.  at 30w5d here for prenatal visit.  She is doing well and has no complaints.  She reports good fetal movement.    ROS-     - No vaginal bleeding    GI- No abdominal pain    BP 98/67   Wt 90 kg (198 lb 6.4 oz)   LMP 2024 (Within Days)   BMI 36.28 kg/m²   Exam - See flow sheet    Fetal heart rate is normal    Assessment-  Diagnoses and all orders for this visit:    Placenta succenturiate lobe affecting fetus    30 weeks gestation of pregnancy    Growth ultrasound today shows appropriate fetal growth.  Baby is noted to be in the breech presentation.  She will start weekly  testing in 2 weeks and follow-up for prenatal care in 2 weeks.

## 2025-04-01 ENCOUNTER — ROUTINE PRENATAL (OUTPATIENT)
Dept: OBSTETRICS AND GYNECOLOGY | Facility: CLINIC | Age: 30
End: 2025-04-01
Payer: COMMERCIAL

## 2025-04-01 VITALS — WEIGHT: 200 LBS | BODY MASS INDEX: 36.57 KG/M2 | DIASTOLIC BLOOD PRESSURE: 72 MMHG | SYSTOLIC BLOOD PRESSURE: 113 MMHG

## 2025-04-01 DIAGNOSIS — Z3A.32 32 WEEKS GESTATION OF PREGNANCY: Primary | ICD-10-CM

## 2025-04-01 DIAGNOSIS — Z34.83 PRENATAL CARE, SUBSEQUENT PREGNANCY IN THIRD TRIMESTER: ICD-10-CM

## 2025-04-01 LAB
GLUCOSE UR STRIP-MCNC: NEGATIVE MG/DL
PROT UR STRIP-MCNC: NEGATIVE MG/DL

## 2025-04-01 NOTE — PROGRESS NOTES
CC: OB follow up    OB follow up     Jannette Moy is a 29 y.o.  32w5d being seen today for her obstetrical visit.  Patient reports occasional periods of tachycardia that improves in several minutes with rest. She is not drinking more than 2 servings of caffeine per day. Reports similar symptoms occurred with prior pregnancy. Fetal movement: normal.     Review of Systems  Cramping/contractions: denies  Vaginal bleeding: denies  Fetal movement: normal    /72   Wt 90.7 kg (200 lb)   LMP 2024 (Within Days)   BMI 36.57 kg/m²   Prenatal Assessment  Fetal Heart Rate: 148  Movement: Present  Presentation: Transverse  Edema  LLE Edema: None  RLE Edema: None  Facial Edema: None    Assessment/Plan    Diagnoses and all orders for this visit:    1. 32 weeks gestation of pregnancy (Primary)  -     POC Urinalysis Dipstick    2. Placenta succenturiate lobe affecting fetus    3. Prenatal care, subsequent pregnancy in third trimester      BPP 8/8, FERNANDO: 14.47 cm, Placental location: Anterior  Fetal position: Transverse. Continue  testing every 2 weeks.   Tachycardia: no S&S at this time. Encouraged to call if not relieved with rest within several minutes. Call if symptoms worsen or become more frequent  Reviewed fetal kick counts  Reviewed S&S labor  Reviewed this stage of pregnancy  Problem list updated     Follow up in 1 week(s) with Dr. Mayelin Roe, APRN  2025  12:06 EDT

## 2025-04-09 ENCOUNTER — TELEPHONE (OUTPATIENT)
Dept: OBSTETRICS AND GYNECOLOGY | Facility: CLINIC | Age: 30
End: 2025-04-09
Payer: COMMERCIAL

## 2025-04-09 DIAGNOSIS — M79.605 LEFT LEG PAIN: Primary | ICD-10-CM

## 2025-04-09 NOTE — TELEPHONE ENCOUNTER
Jannette called because she has not heard anything from the referral for Duplex venous lower extremity to get scheduled and is concerned. Thank you

## 2025-04-09 NOTE — TELEPHONE ENCOUNTER
Spoke with Jannette. She said she had varicose veins with last pregnancy that popped up quickly, so Dr Dick had her get an US. It is the front of her calf under her left knee. She has some varicose veins, darker color slightly warm, can't tell if swollen but had a horrible olman horse on Sunday night and still having pain from that. She wanted to check with Dr Dick for her opinion.

## 2025-04-09 NOTE — TELEPHONE ENCOUNTER
I have placed an order for patient to have a left lower extremity Doppler to rule out DVT.  Can you please schedule?

## 2025-04-09 NOTE — TELEPHONE ENCOUNTER
Dr Dick sent the referral this morning and she did not know Giselle was out. Can you help with this?

## 2025-04-09 NOTE — TELEPHONE ENCOUNTER
I can order a Doppler of her left lower extremity if she would like.  Please let me know and we will get this arranged today

## 2025-04-09 NOTE — TELEPHONE ENCOUNTER
Provider: DR. NOEL    Caller: Jannette Moy    Relationship to Patient: Self    Pharmacy:     Phone Number: [phone]    Reason for Call:REFERRAL FOR- Duplex Venous Lower Extremity -STAT    When was the patient last seen: 04.08.25    PATIENT CALLING TO CHECK STATUS OF REFERRAL FOR Duplex Venous Lower Extremity -STAT    PATIENT HAS NOT HEARD ANYTHING AS OF 02:40 PM    PATIENT CAN BE REACHED [phone]    THANK YOU

## 2025-04-09 NOTE — TELEPHONE ENCOUNTER
Spoke with Jannette and she would like Dr Dick to order the doppler study. She is concerned and it would give her peace of mind.

## 2025-04-09 NOTE — TELEPHONE ENCOUNTER
33wk6d. OB & US 4/15/25. Wants to rule out DVT. Please read and advise. Thank you  Spoke with Jannette. She said she had varicose veins with last pregnancy that popped up quickly, so Dr Dick had her get an US. It is the front of her calf under her left knee. She has some varicose veins, darker color slightly warm, can't tell if swollen but had a horrible olman horse on Sunday night and still having pain from that. She wanted to check with Dr Dick for her opinion.

## 2025-04-09 NOTE — TELEPHONE ENCOUNTER
Left message for patient that she has an appt  tomorrow 4/10/25 @ 10:30 at [address]. Dr. Dick said ok to have in the am. Please go to ED if symptoms worsen.

## 2025-04-09 NOTE — TELEPHONE ENCOUNTER
UNABLE TO WARM TRANSFER    GAVIN LINDSEY    [phone]    PT IS 33wks    PT CALLING BECAUSE SHE THINKS SHE HAS A DVT ON HER L LEG

## 2025-04-10 ENCOUNTER — HOSPITAL ENCOUNTER (OUTPATIENT)
Dept: CARDIOLOGY | Facility: HOSPITAL | Age: 30
Discharge: HOME | End: 2025-04-10
Admitting: OBSTETRICS & GYNECOLOGY
Payer: COMMERCIAL

## 2025-04-10 DIAGNOSIS — M79.605 LEFT LEG PAIN: ICD-10-CM

## 2025-04-10 LAB
BH CV LOWER VASCULAR LEFT COMMON FEMORAL AUGMENT: NORMAL
BH CV LOWER VASCULAR LEFT COMMON FEMORAL COMPETENT: NORMAL
BH CV LOWER VASCULAR LEFT COMMON FEMORAL COMPRESS: NORMAL
BH CV LOWER VASCULAR LEFT COMMON FEMORAL PHASIC: NORMAL
BH CV LOWER VASCULAR LEFT COMMON FEMORAL SPONT: NORMAL
BH CV LOWER VASCULAR LEFT DISTAL FEMORAL COMPRESS: NORMAL
BH CV LOWER VASCULAR LEFT GASTRONEMIUS COMPRESS: NORMAL
BH CV LOWER VASCULAR LEFT GREATER SAPH AK COMPRESS: NORMAL
BH CV LOWER VASCULAR LEFT GREATER SAPH BK COMPRESS: NORMAL
BH CV LOWER VASCULAR LEFT LESSER SAPH COMPRESS: NORMAL
BH CV LOWER VASCULAR LEFT MID FEMORAL AUGMENT: NORMAL
BH CV LOWER VASCULAR LEFT MID FEMORAL COMPETENT: NORMAL
BH CV LOWER VASCULAR LEFT MID FEMORAL COMPRESS: NORMAL
BH CV LOWER VASCULAR LEFT MID FEMORAL PHASIC: NORMAL
BH CV LOWER VASCULAR LEFT MID FEMORAL SPONT: NORMAL
BH CV LOWER VASCULAR LEFT PERONEAL COMPRESS: NORMAL
BH CV LOWER VASCULAR LEFT POPLITEAL AUGMENT: NORMAL
BH CV LOWER VASCULAR LEFT POPLITEAL COMPETENT: NORMAL
BH CV LOWER VASCULAR LEFT POPLITEAL COMPRESS: NORMAL
BH CV LOWER VASCULAR LEFT POPLITEAL PHASIC: NORMAL
BH CV LOWER VASCULAR LEFT POPLITEAL SPONT: NORMAL
BH CV LOWER VASCULAR LEFT POSTERIOR TIBIAL COMPRESS: NORMAL
BH CV LOWER VASCULAR LEFT PROFUNDA FEMORAL COMPRESS: NORMAL
BH CV LOWER VASCULAR LEFT PROXIMAL FEMORAL COMPRESS: NORMAL
BH CV LOWER VASCULAR LEFT SAPHENOFEMORAL JUNCTION COMPRESS: NORMAL
BH CV LOWER VASCULAR RIGHT COMMON FEMORAL AUGMENT: NORMAL
BH CV LOWER VASCULAR RIGHT COMMON FEMORAL COMPETENT: NORMAL
BH CV LOWER VASCULAR RIGHT COMMON FEMORAL COMPRESS: NORMAL
BH CV LOWER VASCULAR RIGHT COMMON FEMORAL PHASIC: NORMAL
BH CV LOWER VASCULAR RIGHT COMMON FEMORAL SPONT: NORMAL

## 2025-04-10 PROCEDURE — 93971 EXTREMITY STUDY: CPT

## 2025-04-15 ENCOUNTER — ROUTINE PRENATAL (OUTPATIENT)
Dept: OBSTETRICS AND GYNECOLOGY | Facility: CLINIC | Age: 30
End: 2025-04-15
Payer: COMMERCIAL

## 2025-04-15 VITALS — WEIGHT: 200 LBS | BODY MASS INDEX: 36.57 KG/M2 | SYSTOLIC BLOOD PRESSURE: 118 MMHG | DIASTOLIC BLOOD PRESSURE: 68 MMHG

## 2025-04-15 DIAGNOSIS — O99.213 OBESITY AFFECTING PREGNANCY IN THIRD TRIMESTER, UNSPECIFIED OBESITY TYPE: ICD-10-CM

## 2025-04-15 DIAGNOSIS — Z3A.34 34 WEEKS GESTATION OF PREGNANCY: Primary | ICD-10-CM

## 2025-04-15 DIAGNOSIS — Z34.83 PRENATAL CARE, SUBSEQUENT PREGNANCY IN THIRD TRIMESTER: ICD-10-CM

## 2025-04-15 LAB
GLUCOSE UR STRIP-MCNC: NEGATIVE MG/DL
PROT UR STRIP-MCNC: NEGATIVE MG/DL

## 2025-04-15 NOTE — PROGRESS NOTES
Chief Complaint   Patient presents with    Routine Prenatal Visit       OB follow up     Jannette Moy is a 29 y.o.  34w5d being seen today for her obstetrical visit. Patient reports no complaints. Fetal movement: normal. She called last week with concerns varicosity in left leg that felt warm, she was concerned for DVT. Negative left lower extremity doppler 4/10/25    Review of Systems  Cramping/contractions: denies  Vaginal bleeding: denies  Fetal movement: normal    /68   Wt 90.7 kg (200 lb)   LMP 2024 (Within Days)   BMI 36.57 kg/m²   Prenatal Assessment  Fetal Heart Rate: 134  Movement: Present  Presentation: Breech       Assessment/Plan    Diagnoses and all orders for this visit:    1. 34 weeks gestation of pregnancy (Primary)  -     POC Urinalysis Dipstick    2. Prenatal care, subsequent pregnancy in third trimester    3. Placenta succenturiate lobe affecting fetus    4. Obesity affecting pregnancy in third trimester, unspecified obesity type    5. Maternal care for breech presentation, single gestation    Growth scan today. Normal Interval growth  EFW:34.1%ile, AC:28.7, HC: 67.2, Estimated fetal weight:  2351 g, 5lb 3oz  BPP 8/8, FERNANDO: 11.56 cm, Placental location: Anterior. Return in one week for weekly  testing  Reviewed fetal kick counts  Reviewed S&S PTL  Varicose vein: Recent negative doppler studies of left lower extremity on 4/10/25. Enc compression stockings.   Reviewed this stage of pregnancy  Problem list updated     Follow up in 1 week(s) with Dr. Dick    I spent 22 minutes caring for Jannette on this date of service. This time includes time spent by me in the following activities: preparing for the visit, reviewing tests, performing a medically appropriate examination and/or evaluation, counseling and educating the patient/family/caregiver, referring and communicating with other health care professionals, documenting information in the medical record,  independently interpreting results and communicating that information with the patient/family/caregiver, care coordination, obtaining a separately obtained history, and reviewing a separately obtained history    JAMEL Benz  4/15/2025  11:32 EDT

## 2025-04-22 ENCOUNTER — ROUTINE PRENATAL (OUTPATIENT)
Dept: OBSTETRICS AND GYNECOLOGY | Facility: CLINIC | Age: 30
End: 2025-04-22
Payer: COMMERCIAL

## 2025-04-22 VITALS — WEIGHT: 203 LBS | DIASTOLIC BLOOD PRESSURE: 73 MMHG | BODY MASS INDEX: 37.12 KG/M2 | SYSTOLIC BLOOD PRESSURE: 109 MMHG

## 2025-04-22 DIAGNOSIS — Z3A.35 35 WEEKS GESTATION OF PREGNANCY: ICD-10-CM

## 2025-04-22 NOTE — PROGRESS NOTES
Chief Complaint   Patient presents with    Routine Prenatal Visit     HPI- Pt is 29 y.o.  at 35w5d here for prenatal visit.  She is doing well with no major complaints.  She reports good fetal movement.    ROS-     - No vaginal bleeding    GI- No abdominal pain    /73   Wt 92.1 kg (203 lb)   LMP 2024 (Within Days)   BMI 37.12 kg/m²   Exam - See flow sheet    Fetal heart rate is normal    Assessment-  Diagnoses and all orders for this visit:    Placenta succenturiate lobe affecting fetus    Maternal care for breech presentation, single gestation    35 weeks gestation of pregnancy    BPP is 8 out of 8, but baby remains in the breech presentation.  I did discuss with her the option of an ECV at 37 weeks if she desires.  She was counseled on the success rate of the procedure and associated risks.  She is currently undecided.  She will see me back in 1 week with ultrasound.

## 2025-04-29 ENCOUNTER — ROUTINE PRENATAL (OUTPATIENT)
Dept: OBSTETRICS AND GYNECOLOGY | Facility: CLINIC | Age: 30
End: 2025-04-29
Payer: COMMERCIAL

## 2025-04-29 VITALS — SYSTOLIC BLOOD PRESSURE: 112 MMHG | WEIGHT: 204.8 LBS | BODY MASS INDEX: 37.45 KG/M2 | DIASTOLIC BLOOD PRESSURE: 72 MMHG

## 2025-04-29 DIAGNOSIS — Z13.89 SCREENING FOR BLOOD OR PROTEIN IN URINE: ICD-10-CM

## 2025-04-29 DIAGNOSIS — Z36.85 ANTENATAL SCREENING FOR STREPTOCOCCUS B: ICD-10-CM

## 2025-04-29 DIAGNOSIS — Z3A.36 36 WEEKS GESTATION OF PREGNANCY: ICD-10-CM

## 2025-04-29 LAB
GLUCOSE UR STRIP-MCNC: NEGATIVE MG/DL
PROT UR STRIP-MCNC: NEGATIVE MG/DL

## 2025-04-29 NOTE — PROGRESS NOTES
No chief complaint on file.    HPI- Pt is 29 y.o.  at 36w5d here for prenatal visit.  Patient overall has been doing well.  She does report that she has not felt as much fetal movement the past 2 days, but baby did get an 8 out of 8 on BPP.    ROS-     - No vaginal bleeding    GI- No abdominal pain    /72   Wt 92.9 kg (204 lb 12.8 oz)   LMP 2024 (Within Days)   BMI 37.45 kg/m²   Exam - See flow sheet    Fetal heart rate is normal    Assessment-  Diagnoses and all orders for this visit:    Maternal care for breech presentation, single gestation  -     Case Request    Placenta succenturiate lobe affecting fetus     screening for streptococcus B  -     Strep B Screen - Swab, Vaginal/Rectum    36 weeks gestation of pregnancy    Screening for blood or protein in urine  -     POC Urinalysis Dipstick    Baby remains in the breech presentation.  Patient was offered an ECV and she declines.  We will go ahead and schedule a primary  at 39+ weeks.  She will continue with weekly  testing.  I did offer to send her over for an NST due to complaints of decreased fetal movement and she declined.  I also offered her to come back on Friday for another BPP if she is still concerned with decreased fetal movement and she states she will call if she has concerns.  GBS was collected and she will see me back in 1 week.

## 2025-05-01 LAB — GP B STREP DNA SPEC QL NAA+PROBE: NEGATIVE

## 2025-05-05 ENCOUNTER — TELEPHONE (OUTPATIENT)
Dept: OBSTETRICS AND GYNECOLOGY | Facility: CLINIC | Age: 30
End: 2025-05-05
Payer: COMMERCIAL

## 2025-05-06 ENCOUNTER — ROUTINE PRENATAL (OUTPATIENT)
Dept: OBSTETRICS AND GYNECOLOGY | Facility: CLINIC | Age: 30
End: 2025-05-06
Payer: COMMERCIAL

## 2025-05-06 VITALS — WEIGHT: 203.6 LBS | SYSTOLIC BLOOD PRESSURE: 120 MMHG | BODY MASS INDEX: 37.23 KG/M2 | DIASTOLIC BLOOD PRESSURE: 73 MMHG

## 2025-05-06 DIAGNOSIS — Z3A.37 37 WEEKS GESTATION OF PREGNANCY: ICD-10-CM

## 2025-05-13 ENCOUNTER — ROUTINE PRENATAL (OUTPATIENT)
Dept: OBSTETRICS AND GYNECOLOGY | Facility: CLINIC | Age: 30
End: 2025-05-13
Payer: COMMERCIAL

## 2025-05-13 VITALS — SYSTOLIC BLOOD PRESSURE: 128 MMHG | BODY MASS INDEX: 37.3 KG/M2 | DIASTOLIC BLOOD PRESSURE: 73 MMHG | WEIGHT: 204 LBS

## 2025-05-13 DIAGNOSIS — Z3A.38 38 WEEKS GESTATION OF PREGNANCY: ICD-10-CM

## 2025-05-13 DIAGNOSIS — Z13.89 SCREENING FOR BLOOD OR PROTEIN IN URINE: ICD-10-CM

## 2025-05-13 RX ORDER — MISOPROSTOL 100 UG/1
800 TABLET ORAL AS NEEDED
OUTPATIENT
Start: 2025-05-13

## 2025-05-13 RX ORDER — SODIUM CHLORIDE, SODIUM LACTATE, POTASSIUM CHLORIDE, CALCIUM CHLORIDE 600; 310; 30; 20 MG/100ML; MG/100ML; MG/100ML; MG/100ML
125 INJECTION, SOLUTION INTRAVENOUS CONTINUOUS
OUTPATIENT
Start: 2025-05-13 | End: 2025-05-14

## 2025-05-13 RX ORDER — OXYTOCIN/0.9 % SODIUM CHLORIDE 30/500 ML
250 PLASTIC BAG, INJECTION (ML) INTRAVENOUS CONTINUOUS
OUTPATIENT
Start: 2025-05-13 | End: 2025-05-13

## 2025-05-13 RX ORDER — OXYTOCIN/0.9 % SODIUM CHLORIDE 30/500 ML
999 PLASTIC BAG, INJECTION (ML) INTRAVENOUS ONCE
OUTPATIENT
Start: 2025-05-13 | End: 2025-05-13

## 2025-05-13 RX ORDER — SODIUM CHLORIDE 9 MG/ML
40 INJECTION, SOLUTION INTRAVENOUS AS NEEDED
OUTPATIENT
Start: 2025-05-13

## 2025-05-13 RX ORDER — CARBOPROST TROMETHAMINE 250 UG/ML
250 INJECTION, SOLUTION INTRAMUSCULAR AS NEEDED
OUTPATIENT
Start: 2025-05-13

## 2025-05-13 RX ORDER — LIDOCAINE HYDROCHLORIDE 10 MG/ML
0.5 INJECTION, SOLUTION INFILTRATION; PERINEURAL ONCE AS NEEDED
OUTPATIENT
Start: 2025-05-13

## 2025-05-13 RX ORDER — METHYLERGONOVINE MALEATE 0.2 MG/ML
200 INJECTION INTRAVENOUS ONCE AS NEEDED
OUTPATIENT
Start: 2025-05-13

## 2025-05-13 RX ORDER — KETOROLAC TROMETHAMINE 15 MG/ML
30 INJECTION, SOLUTION INTRAMUSCULAR; INTRAVENOUS ONCE
OUTPATIENT
Start: 2025-05-13 | End: 2025-05-13

## 2025-05-13 RX ORDER — SODIUM CHLORIDE 0.9 % (FLUSH) 0.9 %
10 SYRINGE (ML) INJECTION EVERY 12 HOURS SCHEDULED
OUTPATIENT
Start: 2025-05-13

## 2025-05-13 RX ORDER — ACETAMINOPHEN 500 MG
1000 TABLET ORAL ONCE
OUTPATIENT
Start: 2025-05-13 | End: 2025-05-13

## 2025-05-13 RX ORDER — SODIUM CHLORIDE 0.9 % (FLUSH) 0.9 %
10 SYRINGE (ML) INJECTION AS NEEDED
OUTPATIENT
Start: 2025-05-13

## 2025-05-13 NOTE — PROGRESS NOTES
Chief Complaint   Patient presents with    Routine Prenatal Visit     HPI- Pt is 29 y.o.  at 38w5d here for prenatal visit.  Patient continues to do well with no concerns.  She reports good fetal movement.       ROS-     - No vaginal bleeding    GI- No abdominal pain    /73   Wt 92.5 kg (204 lb)   LMP 2024 (Within Days)   BMI 37.30 kg/m²   Exam - See flow sheet    Fetal heart rate is normal    Assessment-  Diagnoses and all orders for this visit:    Placenta succenturiate lobe affecting fetus    Maternal care for breech presentation, single gestation  -     sodium chloride 0.9 % flush 10 mL  -     sodium chloride 0.9 % flush 10 mL  -     sodium chloride 0.9 % infusion 40 mL  -     lidocaine (XYLOCAINE) 1 % injection 0.5 mL  -     lactated ringers bolus 1,000 mL  -     lactated ringers infusion  -     acetaminophen (TYLENOL) tablet 1,000 mg  -     oxytocin (PITOCIN) 30 units in 0.9% sodium chloride 500 mL (premix)  -     oxytocin (PITOCIN) 30 units in 0.9% sodium chloride 500 mL (premix)  -     ketorolac (TORADOL) injection 30 mg  -     methylergonovine (METHERGINE) injection 200 mcg  -     carboprost (HEMABATE) injection 250 mcg  -     miSOPROStol (CYTOTEC) tablet 800 mcg  -     ceFAZolin (ANCEF) 2,000 mg in sodium chloride 0.9 % 100 mL IVPB    38 weeks gestation of pregnancy    Other orders  -     Admit To Obstetrics Inpatient; Standing  -     Code Status and Medical Interventions: CPR (Attempt to Resuscitate); Full Support; Standing  -     Obtain informed consent; Standing  -     Vital Signs q 4 while awake; Standing  -     Vital Signs Per Hospital Policy; Standing  -     Continuous Fetal Monitoring With NST on Admission and Prior to Initiation of Oxytocin.; Standing  -     External Uterine Contraction Monitoring; Standing  -     Notify Physician (specified); Standing  -     Notify physician for tachysystole (per hospital algorithm); Standing  -     Notify physician if membranes ruptured,  bleeding greater than 1 pad an hour, fetal heart tone abnormality, and severe pain; Standing  -     Initiate Group Beta Strep (GBS) Prophylaxis Protocol, If Criteria Met; Standing  -     Insert Indwelling Urinary Catheter; Standing  -     Assess Need for Indwelling Urinary Catheter - Follow Removal Protocol; Standing  -     Urinary Catheter Care; Standing  -     Abdominal Prep with Clippers; Standing  -     Chlorhexadine Skin Prep Unless Otherwise Indicated; Standing  -     SCD (sequential compression devices); Standing  -     POC Glucose Once; Standing  -     Document Gatorade Consumption Prior to Admission (Yes or No); Standing  -     NPO Diet NPO Type: Ice Chips; Standing  -     Inpatient Anesthesiology Consult; Standing  -     Type & Screen; Standing  -     CBC (No Diff); Standing  -     Treponema pallidum AB w/Reflex RPR; Standing  -     Insert Peripheral IV; Standing  -     Saline Lock & Maintain IV Access; Standing  -     Notify Physician (specified); Standing  -     Vital Signs Per Hospital Policy; Standing  -     Strict Bed Rest; Standing  -     Fundal & Lochia Check; Standing  -     Fundal & Lochia Check; Standing  -     Diet: Regular/House; Fluid Consistency: Thin (IDDSI 0); Standing    Growth ultrasound today shows appropriate fetal growth and baby remains in the breech presentation.  She is scheduled next week for primary .

## 2025-05-14 LAB
GLUCOSE UR STRIP-MCNC: NEGATIVE MG/DL
PROT UR STRIP-MCNC: NEGATIVE MG/DL

## 2025-05-20 ENCOUNTER — ANESTHESIA EVENT (OUTPATIENT)
Dept: LABOR AND DELIVERY | Facility: HOSPITAL | Age: 30
End: 2025-05-20
Payer: COMMERCIAL

## 2025-05-20 ENCOUNTER — ANESTHESIA (OUTPATIENT)
Dept: LABOR AND DELIVERY | Facility: HOSPITAL | Age: 30
End: 2025-05-20
Payer: COMMERCIAL

## 2025-05-20 ENCOUNTER — HOSPITAL ENCOUNTER (INPATIENT)
Facility: HOSPITAL | Age: 30
LOS: 2 days | Discharge: HOME | End: 2025-05-22
Attending: OBSTETRICS & GYNECOLOGY | Admitting: OBSTETRICS & GYNECOLOGY
Payer: COMMERCIAL

## 2025-05-20 DIAGNOSIS — Z98.891 S/P CESAREAN SECTION: Primary | ICD-10-CM

## 2025-05-20 LAB
ABO GROUP BLD: NORMAL
BLD GP AB SCN SERPL QL: NEGATIVE
DEPRECATED RDW RBC AUTO: 39.7 FL (ref 37–54)
ERYTHROCYTE [DISTWIDTH] IN BLOOD BY AUTOMATED COUNT: 12.2 % (ref 12.3–15.4)
HCT VFR BLD AUTO: 41.8 % (ref 34–46.6)
HGB BLD-MCNC: 14.2 G/DL (ref 12–15.9)
MCH RBC QN AUTO: 30.1 PG (ref 26.6–33)
MCHC RBC AUTO-ENTMCNC: 34 G/DL (ref 31.5–35.7)
MCV RBC AUTO: 88.6 FL (ref 79–97)
PLATELET # BLD AUTO: 152 10*3/MM3 (ref 140–450)
PMV BLD AUTO: 11.1 FL (ref 6–12)
RBC # BLD AUTO: 4.72 10*6/MM3 (ref 3.77–5.28)
RH BLD: POSITIVE
T&S EXPIRATION DATE: NORMAL
TREPONEMA PALLIDUM IGG+IGM AB [PRESENCE] IN SERUM OR PLASMA BY IMMUNOASSAY: NORMAL
WBC NRBC COR # BLD AUTO: 10 10*3/MM3 (ref 3.4–10.8)

## 2025-05-20 PROCEDURE — 25010000002 ONDANSETRON PER 1 MG: Performed by: ANESTHESIOLOGY

## 2025-05-20 PROCEDURE — 86900 BLOOD TYPING SEROLOGIC ABO: CPT | Performed by: OBSTETRICS & GYNECOLOGY

## 2025-05-20 PROCEDURE — 25810000003 LACTATED RINGERS PER 1000 ML: Performed by: OBSTETRICS & GYNECOLOGY

## 2025-05-20 PROCEDURE — 85027 COMPLETE CBC AUTOMATED: CPT | Performed by: OBSTETRICS & GYNECOLOGY

## 2025-05-20 PROCEDURE — 25010000002 BUPIVACAINE PF 0.75 % SOLUTION: Performed by: ANESTHESIOLOGY

## 2025-05-20 PROCEDURE — 25010000002 CEFAZOLIN PER 500 MG: Performed by: OBSTETRICS & GYNECOLOGY

## 2025-05-20 PROCEDURE — 25010000002 PHENYLEPHRINE 10 MG/ML SOLUTION: Performed by: ANESTHESIOLOGY

## 2025-05-20 PROCEDURE — 86780 TREPONEMA PALLIDUM: CPT | Performed by: OBSTETRICS & GYNECOLOGY

## 2025-05-20 PROCEDURE — 86850 RBC ANTIBODY SCREEN: CPT | Performed by: OBSTETRICS & GYNECOLOGY

## 2025-05-20 PROCEDURE — 25010000002 FENTANYL CITRATE (PF) 50 MCG/ML SOLUTION: Performed by: ANESTHESIOLOGY

## 2025-05-20 PROCEDURE — 25010000002 MORPHINE PER 10 MG: Performed by: ANESTHESIOLOGY

## 2025-05-20 PROCEDURE — 59510 CESAREAN DELIVERY: CPT | Performed by: OBSTETRICS & GYNECOLOGY

## 2025-05-20 PROCEDURE — 25010000002 KETOROLAC TROMETHAMINE PER 15 MG: Performed by: OBSTETRICS & GYNECOLOGY

## 2025-05-20 PROCEDURE — 59514 CESAREAN DELIVERY ONLY: CPT | Performed by: STUDENT IN AN ORGANIZED HEALTH CARE EDUCATION/TRAINING PROGRAM

## 2025-05-20 PROCEDURE — 86901 BLOOD TYPING SEROLOGIC RH(D): CPT | Performed by: OBSTETRICS & GYNECOLOGY

## 2025-05-20 PROCEDURE — 25010000002 FAMOTIDINE 10 MG/ML SOLUTION: Performed by: ANESTHESIOLOGY

## 2025-05-20 RX ORDER — ACETAMINOPHEN 500 MG
1000 TABLET ORAL ONCE
Status: COMPLETED | OUTPATIENT
Start: 2025-05-20 | End: 2025-05-20

## 2025-05-20 RX ORDER — OXYCODONE HYDROCHLORIDE 5 MG/1
5 TABLET ORAL EVERY 4 HOURS PRN
Status: DISCONTINUED | OUTPATIENT
Start: 2025-05-20 | End: 2025-05-22 | Stop reason: HOSPADM

## 2025-05-20 RX ORDER — DIPHENHYDRAMINE HYDROCHLORIDE 50 MG/ML
25 INJECTION, SOLUTION INTRAMUSCULAR; INTRAVENOUS EVERY 4 HOURS PRN
Status: DISCONTINUED | OUTPATIENT
Start: 2025-05-20 | End: 2025-05-22 | Stop reason: HOSPADM

## 2025-05-20 RX ORDER — SODIUM CHLORIDE 0.9 % (FLUSH) 0.9 %
10 SYRINGE (ML) INJECTION EVERY 12 HOURS SCHEDULED
Status: DISCONTINUED | OUTPATIENT
Start: 2025-05-20 | End: 2025-05-20 | Stop reason: HOSPADM

## 2025-05-20 RX ORDER — ACETAMINOPHEN 500 MG
1000 TABLET ORAL EVERY 6 HOURS
Status: DISPENSED | OUTPATIENT
Start: 2025-05-20 | End: 2025-05-21

## 2025-05-20 RX ORDER — HYDROCORTISONE 25 MG/G
CREAM TOPICAL 3 TIMES DAILY PRN
Status: DISCONTINUED | OUTPATIENT
Start: 2025-05-20 | End: 2025-05-22 | Stop reason: HOSPADM

## 2025-05-20 RX ORDER — PROMETHAZINE HYDROCHLORIDE 12.5 MG/1
12.5 TABLET ORAL EVERY 4 HOURS PRN
Status: DISCONTINUED | OUTPATIENT
Start: 2025-05-20 | End: 2025-05-22 | Stop reason: HOSPADM

## 2025-05-20 RX ORDER — MISOPROSTOL 200 UG/1
800 TABLET ORAL AS NEEDED
Status: DISCONTINUED | OUTPATIENT
Start: 2025-05-20 | End: 2025-05-20 | Stop reason: HOSPADM

## 2025-05-20 RX ORDER — PRENATAL VIT/IRON FUM/FOLIC AC 27MG-0.8MG
1 TABLET ORAL DAILY
Status: DISCONTINUED | OUTPATIENT
Start: 2025-05-20 | End: 2025-05-22 | Stop reason: HOSPADM

## 2025-05-20 RX ORDER — SODIUM CHLORIDE 0.9 % (FLUSH) 0.9 %
10 SYRINGE (ML) INJECTION AS NEEDED
Status: DISCONTINUED | OUTPATIENT
Start: 2025-05-20 | End: 2025-05-20 | Stop reason: HOSPADM

## 2025-05-20 RX ORDER — ONDANSETRON 4 MG/1
4 TABLET, ORALLY DISINTEGRATING ORAL EVERY 8 HOURS PRN
Status: DISCONTINUED | OUTPATIENT
Start: 2025-05-20 | End: 2025-05-22 | Stop reason: HOSPADM

## 2025-05-20 RX ORDER — IBUPROFEN 600 MG/1
600 TABLET, FILM COATED ORAL EVERY 6 HOURS
Status: DISCONTINUED | OUTPATIENT
Start: 2025-05-21 | End: 2025-05-22 | Stop reason: HOSPADM

## 2025-05-20 RX ORDER — CARBOPROST TROMETHAMINE 250 UG/ML
250 INJECTION, SOLUTION INTRAMUSCULAR AS NEEDED
Status: DISCONTINUED | OUTPATIENT
Start: 2025-05-20 | End: 2025-05-20 | Stop reason: HOSPADM

## 2025-05-20 RX ORDER — NALOXONE HCL 0.4 MG/ML
0.2 VIAL (ML) INJECTION
Status: DISCONTINUED | OUTPATIENT
Start: 2025-05-20 | End: 2025-05-22 | Stop reason: HOSPADM

## 2025-05-20 RX ORDER — KETOROLAC TROMETHAMINE 30 MG/ML
30 INJECTION, SOLUTION INTRAMUSCULAR; INTRAVENOUS ONCE
Status: COMPLETED | OUTPATIENT
Start: 2025-05-20 | End: 2025-05-20

## 2025-05-20 RX ORDER — DOCUSATE SODIUM 100 MG/1
100 CAPSULE, LIQUID FILLED ORAL 2 TIMES DAILY PRN
Status: DISCONTINUED | OUTPATIENT
Start: 2025-05-20 | End: 2025-05-22 | Stop reason: HOSPADM

## 2025-05-20 RX ORDER — DIPHENHYDRAMINE HCL 25 MG
25 CAPSULE ORAL EVERY 4 HOURS PRN
Status: DISCONTINUED | OUTPATIENT
Start: 2025-05-20 | End: 2025-05-22 | Stop reason: HOSPADM

## 2025-05-20 RX ORDER — SODIUM CHLORIDE 9 MG/ML
40 INJECTION, SOLUTION INTRAVENOUS AS NEEDED
Status: DISCONTINUED | OUTPATIENT
Start: 2025-05-20 | End: 2025-05-20 | Stop reason: HOSPADM

## 2025-05-20 RX ORDER — FAMOTIDINE 10 MG/ML
20 INJECTION, SOLUTION INTRAVENOUS ONCE AS NEEDED
Status: COMPLETED | OUTPATIENT
Start: 2025-05-20 | End: 2025-05-20

## 2025-05-20 RX ORDER — CITRIC ACID/SODIUM CITRATE 334-500MG
30 SOLUTION, ORAL ORAL ONCE
Status: COMPLETED | OUTPATIENT
Start: 2025-05-20 | End: 2025-05-20

## 2025-05-20 RX ORDER — ONDANSETRON 2 MG/ML
4 INJECTION INTRAMUSCULAR; INTRAVENOUS ONCE AS NEEDED
Status: COMPLETED | OUTPATIENT
Start: 2025-05-20 | End: 2025-05-20

## 2025-05-20 RX ORDER — BUPIVACAINE HYDROCHLORIDE 7.5 MG/ML
INJECTION, SOLUTION EPIDURAL; RETROBULBAR
Status: COMPLETED | OUTPATIENT
Start: 2025-05-20 | End: 2025-05-20

## 2025-05-20 RX ORDER — DROPERIDOL 2.5 MG/ML
0.62 INJECTION, SOLUTION INTRAMUSCULAR; INTRAVENOUS
Status: DISCONTINUED | OUTPATIENT
Start: 2025-05-20 | End: 2025-05-22 | Stop reason: HOSPADM

## 2025-05-20 RX ORDER — EPHEDRINE SULFATE 50 MG/ML
INJECTION, SOLUTION INTRAVENOUS AS NEEDED
Status: DISCONTINUED | OUTPATIENT
Start: 2025-05-20 | End: 2025-05-20 | Stop reason: SURG

## 2025-05-20 RX ORDER — ONDANSETRON 2 MG/ML
4 INJECTION INTRAMUSCULAR; INTRAVENOUS EVERY 6 HOURS PRN
Status: DISCONTINUED | OUTPATIENT
Start: 2025-05-20 | End: 2025-05-22 | Stop reason: HOSPADM

## 2025-05-20 RX ORDER — OXYTOCIN/0.9 % SODIUM CHLORIDE 30/500 ML
125 PLASTIC BAG, INJECTION (ML) INTRAVENOUS ONCE AS NEEDED
Status: COMPLETED | OUTPATIENT
Start: 2025-05-20 | End: 2025-05-20

## 2025-05-20 RX ORDER — HYDROMORPHONE HYDROCHLORIDE 1 MG/ML
0.5 INJECTION, SOLUTION INTRAMUSCULAR; INTRAVENOUS; SUBCUTANEOUS
Status: DISCONTINUED | OUTPATIENT
Start: 2025-05-20 | End: 2025-05-20 | Stop reason: HOSPADM

## 2025-05-20 RX ORDER — ONDANSETRON 2 MG/ML
4 INJECTION INTRAMUSCULAR; INTRAVENOUS ONCE AS NEEDED
Status: DISCONTINUED | OUTPATIENT
Start: 2025-05-20 | End: 2025-05-22 | Stop reason: HOSPADM

## 2025-05-20 RX ORDER — PHENYLEPHRINE HYDROCHLORIDE 10 MG/ML
INJECTION INTRAVENOUS AS NEEDED
Status: DISCONTINUED | OUTPATIENT
Start: 2025-05-20 | End: 2025-05-20 | Stop reason: SURG

## 2025-05-20 RX ORDER — OXYTOCIN/0.9 % SODIUM CHLORIDE 30/500 ML
250 PLASTIC BAG, INJECTION (ML) INTRAVENOUS CONTINUOUS
Status: ACTIVE | OUTPATIENT
Start: 2025-05-20 | End: 2025-05-20

## 2025-05-20 RX ORDER — OXYTOCIN/0.9 % SODIUM CHLORIDE 30/500 ML
999 PLASTIC BAG, INJECTION (ML) INTRAVENOUS ONCE
Status: COMPLETED | OUTPATIENT
Start: 2025-05-20 | End: 2025-05-20

## 2025-05-20 RX ORDER — OXYCODONE HYDROCHLORIDE 10 MG/1
10 TABLET ORAL EVERY 4 HOURS PRN
Status: DISCONTINUED | OUTPATIENT
Start: 2025-05-20 | End: 2025-05-22 | Stop reason: HOSPADM

## 2025-05-20 RX ORDER — ACETAMINOPHEN 325 MG/1
650 TABLET ORAL EVERY 6 HOURS
Status: DISCONTINUED | OUTPATIENT
Start: 2025-05-21 | End: 2025-05-22 | Stop reason: HOSPADM

## 2025-05-20 RX ORDER — ENOXAPARIN SODIUM 100 MG/ML
40 INJECTION SUBCUTANEOUS EVERY 24 HOURS
Status: DISCONTINUED | OUTPATIENT
Start: 2025-05-21 | End: 2025-05-22 | Stop reason: HOSPADM

## 2025-05-20 RX ORDER — KETOROLAC TROMETHAMINE 15 MG/ML
15 INJECTION, SOLUTION INTRAMUSCULAR; INTRAVENOUS EVERY 6 HOURS
Status: COMPLETED | OUTPATIENT
Start: 2025-05-20 | End: 2025-05-21

## 2025-05-20 RX ORDER — MORPHINE SULFATE 4 MG/ML
INJECTION, SOLUTION INTRAMUSCULAR; INTRAVENOUS
Status: COMPLETED | OUTPATIENT
Start: 2025-05-20 | End: 2025-05-20

## 2025-05-20 RX ORDER — MORPHINE SULFATE 2 MG/ML
2 INJECTION, SOLUTION INTRAMUSCULAR; INTRAVENOUS
Status: ACTIVE | OUTPATIENT
Start: 2025-05-20 | End: 2025-05-20

## 2025-05-20 RX ORDER — HYDROXYZINE HYDROCHLORIDE 50 MG/1
50 TABLET, FILM COATED ORAL EVERY 6 HOURS PRN
Status: DISCONTINUED | OUTPATIENT
Start: 2025-05-20 | End: 2025-05-22 | Stop reason: HOSPADM

## 2025-05-20 RX ORDER — FENTANYL CITRATE 50 UG/ML
INJECTION, SOLUTION INTRAMUSCULAR; INTRAVENOUS
Status: COMPLETED | OUTPATIENT
Start: 2025-05-20 | End: 2025-05-20

## 2025-05-20 RX ORDER — METHYLERGONOVINE MALEATE 0.2 MG/ML
200 INJECTION INTRAVENOUS ONCE AS NEEDED
Status: DISCONTINUED | OUTPATIENT
Start: 2025-05-20 | End: 2025-05-20 | Stop reason: HOSPADM

## 2025-05-20 RX ORDER — SODIUM CHLORIDE, SODIUM LACTATE, POTASSIUM CHLORIDE, CALCIUM CHLORIDE 600; 310; 30; 20 MG/100ML; MG/100ML; MG/100ML; MG/100ML
125 INJECTION, SOLUTION INTRAVENOUS CONTINUOUS
Status: DISCONTINUED | OUTPATIENT
Start: 2025-05-20 | End: 2025-05-20

## 2025-05-20 RX ORDER — LIDOCAINE HYDROCHLORIDE 10 MG/ML
0.5 INJECTION, SOLUTION INFILTRATION; PERINEURAL ONCE AS NEEDED
Status: DISCONTINUED | OUTPATIENT
Start: 2025-05-20 | End: 2025-05-20 | Stop reason: HOSPADM

## 2025-05-20 RX ADMIN — SODIUM CHLORIDE 2000 MG: 900 INJECTION INTRAVENOUS at 10:39

## 2025-05-20 RX ADMIN — ONDANSETRON 4 MG: 2 INJECTION, SOLUTION INTRAMUSCULAR; INTRAVENOUS at 10:11

## 2025-05-20 RX ADMIN — ACETAMINOPHEN 1000 MG: 500 TABLET, FILM COATED ORAL at 23:11

## 2025-05-20 RX ADMIN — KETOROLAC TROMETHAMINE 15 MG: 15 INJECTION, SOLUTION INTRAMUSCULAR; INTRAVENOUS at 18:41

## 2025-05-20 RX ADMIN — PHENYLEPHRINE HYDROCHLORIDE 100 MCG: 10 INJECTION INTRAVENOUS at 10:59

## 2025-05-20 RX ADMIN — Medication 30 ML: at 10:11

## 2025-05-20 RX ADMIN — SODIUM CHLORIDE, POTASSIUM CHLORIDE, SODIUM LACTATE AND CALCIUM CHLORIDE: 600; 310; 30; 20 INJECTION, SOLUTION INTRAVENOUS at 10:57

## 2025-05-20 RX ADMIN — EPHEDRINE SULFATE 7.5 MG: 50 INJECTION INTRAVENOUS at 10:52

## 2025-05-20 RX ADMIN — PHENYLEPHRINE HYDROCHLORIDE 100 MCG: 10 INJECTION INTRAVENOUS at 10:54

## 2025-05-20 RX ADMIN — SODIUM CHLORIDE, POTASSIUM CHLORIDE, SODIUM LACTATE AND CALCIUM CHLORIDE 125 ML/HR: 600; 310; 30; 20 INJECTION, SOLUTION INTRAVENOUS at 07:12

## 2025-05-20 RX ADMIN — PHENYLEPHRINE HYDROCHLORIDE 100 MCG: 10 INJECTION INTRAVENOUS at 10:46

## 2025-05-20 RX ADMIN — FENTANYL CITRATE 10 MCG: 50 INJECTION, SOLUTION INTRAMUSCULAR; INTRAVENOUS at 10:43

## 2025-05-20 RX ADMIN — PHENYLEPHRINE HYDROCHLORIDE 100 MCG: 10 INJECTION INTRAVENOUS at 10:43

## 2025-05-20 RX ADMIN — ACETAMINOPHEN 1000 MG: 500 TABLET, FILM COATED ORAL at 10:11

## 2025-05-20 RX ADMIN — ACETAMINOPHEN 1000 MG: 500 TABLET, FILM COATED ORAL at 16:24

## 2025-05-20 RX ADMIN — Medication 125 ML/HR: at 12:34

## 2025-05-20 RX ADMIN — MORPHINE SULFATE 100 MCG: 4 INJECTION, SOLUTION INTRAMUSCULAR; INTRAVENOUS at 10:43

## 2025-05-20 RX ADMIN — BUPIVACAINE HYDROCHLORIDE 1.4 ML: 7.5 INJECTION, SOLUTION EPIDURAL; RETROBULBAR at 10:43

## 2025-05-20 RX ADMIN — KETOROLAC TROMETHAMINE 30 MG: 30 INJECTION INTRAMUSCULAR; INTRAVENOUS at 11:54

## 2025-05-20 RX ADMIN — Medication 999 ML/HR: at 11:04

## 2025-05-20 RX ADMIN — PHENYLEPHRINE HYDROCHLORIDE 100 MCG: 10 INJECTION INTRAVENOUS at 10:50

## 2025-05-20 RX ADMIN — FAMOTIDINE 20 MG: 10 INJECTION INTRAVENOUS at 10:11

## 2025-05-20 NOTE — H&P
[facility]  Obstetric History and Physical    Chief Complaint   Patient presents with    Scheduled      Term Breech       Subjective     Patient is a 29 y.o. female  currently at 39w5d, who presents for scheduled  section for breech presentation.  Pregnancy has been uncomplicated.  Patient was offered an ECV, and declined.    The following portions of the patients history were reviewed and updated as appropriate: current medications, allergies, past medical history, past surgical history, past family history, past social history, and problem list .       Prenatal Information:  Prenatal Results       Initial Prenatal Labs       Test Value Reference Range Date Time    Hemoglobin  13.0 g/dL 11.1 - 15.9 10/29/24 1552    Hematocrit  38.5 % 34.0 - 46.6 10/29/24 1552    Platelets  270 x10E3/uL 150 - 450 10/29/24 1552    Rubella IgG  27.10 index Immune >0.99 10/29/24 1552    Hepatitis B SAg  Negative  Negative 10/29/24 1552    Hepatitis C Ab        RPR        T. Pallidum Ab   Non-Reactive  Non-Reactive 25 0655       Non Reactive  Non Reactive 25 1035       Non Reactive  Non Reactive 10/29/24 1552    ABO  O   25 0655    Rh  Positive   25 0655    Antibody Screen  Negative  Negative 10/29/24 1552    HIV  Non Reactive  Non Reactive 10/29/24 1552    Urine Culture  Final report   10/29/24 1536    Gonorrhea  Negative  Negative 10/29/24 1536    Chlamydia  Negative  Negative 10/29/24 1536    TSH        HgB A1c         Varicella IgG        Hemoglobinopathy Fractionation        Hemoglobinopathy (genetic testing)        Cystic fibrosis         Spinal muscular atrophy        Fragile X                  Fetal testing        Test Value Reference Range Date Time    NIPT        MSAFP        AFP-4                  2nd and 3rd Trimester       Test Value Reference Range Date Time    Hemoglobin (repeated)  14.2 g/dL 12.0 - 15.9 25 0655       12.6 g/dL 12.0 - 15.9 25 1035     Hematocrit (repeated)  41.8 % 34.0 - 46.6 05/20/25 0655       39.3 % 34.0 - 46.6 02/21/25 1035    Platelets   152 10*3/mm3 140 - 450 05/20/25 0655       186 10*3/mm3 140 - 450 02/21/25 1035       270 x10E3/uL 150 - 450 10/29/24 1552    1 hour GTT   88 mg/dL 65 - 139 02/21/25 1035    Antibody Screen (repeated)  Negative   05/20/25 0655    3rd TM syphilis scrn (repeated)  RPR         3rd TM syphilis scrn (repeated) TP-Ab  Non-Reactive  Non-Reactive 05/20/25 0655       Non Reactive  Non Reactive 02/21/25 1035    3rd TM syphilis screen TB-Ab (FTA)  Non-Reactive  Non-Reactive 05/20/25 0655       Non Reactive  Non Reactive 02/21/25 1035    Syphilis cascade test TP-Ab (EIA)        Syphilis cascade TPPA        GTT Fasting        GTT 1 Hr        GTT 2 Hr        GTT 3 Hr        Group B Strep  Negative  Negative 04/29/25 1350              Other testing        Test Value Reference Range Date Time    Parvo IgG         CMV IgG                   Drug Screening       Test Value Reference Range Date Time    Amphetamine Screen  Negative ng/mL Bzgrzj=3114 10/29/24 1536    Barbiturate Screen  Negative ng/mL Sfiaoo=355 10/29/24 1536    Benzodiazepine Screen  Negative ng/mL Ytubqb=888 10/29/24 1536    Methadone Screen  Negative ng/mL Rfjxcy=140 10/29/24 1536    Phencyclidine Screen  Negative ng/mL Cutoff=25 10/29/24 1536    Opiates Screen  Negative ng/mL Jiplwt=910 10/29/24 1536    THC Screen  Negative ng/mL Cutoff=50 10/29/24 1536    Cocaine Screen  Negative ng/mL Ofysid=775 10/29/24 1536    Propoxyphene Screen  Negative ng/mL Dfeskg=418 10/29/24 1536    Buprenorphine Screen        Methamphetamine Screen        Oxycodone Screen        Tricyclic Antidepressants Screen                  Legend    ^: Historical                          External Prenatal Results       Pregnancy Outside Results - Transcribed From Office Records - See Scanned Records For Details       Test Value Date Time    ABO  O  05/20/25 0655    Rh  Positive  05/20/25 0655     Antibody Screen  Negative  05/20/25 0655       Negative  10/29/24 1552    Varicella IgG       Rubella  27.10 index 10/29/24 1552    Hgb  14.2 g/dL 05/20/25 0655       12.6 g/dL 02/21/25 1035       13.0 g/dL 10/29/24 1552    Hct  41.8 % 05/20/25 0655       39.3 % 02/21/25 1035       38.5 % 10/29/24 1552    HgB A1c        1h GTT  88 mg/dL 02/21/25 1035    3h GTT Fasting       3h GTT 1 hour       3h GTT 2 hour       3h GTT 3 hour        Gonorrhea (discrete)  Negative  10/29/24 1536    Chlamydia (discrete)  Negative  10/29/24 1536    RPR       Syphils cascade: TP-Ab (FTA)  Non-Reactive  05/20/25 0655       Non Reactive  02/21/25 1035       Non Reactive  10/29/24 1552    TP-Ab  Non-Reactive  05/20/25 0655       Non Reactive  02/21/25 1035       Non Reactive  10/29/24 1552    TP-Ab (EIA)       TPPA       HBsAg  Negative  10/29/24 1552    Herpes Simplex Virus PCR       Herpes Simplex VIrus Culture       HIV  Non Reactive  10/29/24 1552    Hep C RNA Quant PCR       Hep C Antibody       AFP       NIPT       Cystic Fibrosis ([place])       Cystic Fibroisis        Spinal Muscular atrophy       Fragile X       Group B Strep  Negative  04/29/25 1350    GBS Susceptibility to Clindamycin       GBS Susceptibility to Erythromycin       Fetal Fibronectin       Genetic Testing, Maternal Blood                 Drug Screening       Test Value Date Time    Urine Drug Screen       Amphetamine Screen  Negative ng/mL 10/29/24 1536    Barbiturate Screen  Negative ng/mL 10/29/24 1536    Benzodiazepine Screen  Negative ng/mL 10/29/24 1536    Methadone Screen  Negative ng/mL 10/29/24 1536    Phencyclidine Screen  Negative ng/mL 10/29/24 1536    Opiates Screen       THC Screen       Cocaine Screen       Propoxyphene Screen  Negative ng/mL 10/29/24 1536    Buprenorphine Screen       Methamphetamine Screen       Oxycodone Screen       Tricyclic Antidepressants Screen                 Legend    ^: Historical                             Past OB  History:     OB History    Para Term  AB Living   2 1 1 0 0 1   SAB IAB Ectopic Molar Multiple Live Births   0 0 0 0 0 1      # Outcome Date GA Lbr Baldemar/2nd Weight Sex Type Anes PTL Lv   2 Current            1 Term 22 40w2d 02:24 / 01:48 3430 g (7 lb 9 oz) M Vag-Spont EPI N RADHA      Birth Comments: LDR5 Trace      Name: MAI LINDSEY      Apgar1: 9  Apgar5: 9       Past Medical History: History reviewed. No pertinent past medical history.   Past Surgical History Past Surgical History:   Procedure Laterality Date    TONSILLECTOMY      WISDOM TOOTH EXTRACTION        Family History: History reviewed. No pertinent family history.   Social History:  reports that she has never smoked. She has never used smokeless tobacco.   reports no history of alcohol use.   reports no history of drug use.        General ROS: Pertinent items are noted in HPI, all other systems reviewed and negative    Objective       Vital Signs Range for the last 24 hours  Temperature:     Temp Source:     BP: BP: ()/(60-74) 107/74   Pulse: Heart Rate:  [78-97] 89   Respirations:     SPO2:     O2 Amount (l/min):     O2 Devices     Weight: Weight:  [92.1 kg (203 lb)-93.3 kg (205 lb 9.6 oz)] 92.1 kg (203 lb)     Physical Examination: General appearance - alert, well appearing, and in no distress  Chest - clear to auscultation, no wheezes, rales or rhonchi, symmetric air entry  Heart - normal rate and regular rhythm  Abdomen -gravid, nontender  Pelvic -deferred  Extremities - peripheral pulses normal, no pedal edema, no clubbing or cyanosis    Presentation: Breech by bedside ultrasound   Cervix: Exam by:     Dilation:     Effacement:     Station:         Fetal Heart Rate Assessment   Method: Fetal HR Assessment Method: external   Beats/min: Fetal HR (beats/min): 125   Baseline: Fetal HR Baseline: normal range   Variability: Fetal HR Variability: moderate (amplitude range 6 to 25 bpm)   Accels: Fetal HR Accelerations: greater  than/equal to 15 bpm, lasting at least 15 seconds   Decels: Fetal HR Decelerations: absent   Tracing Category:       Uterine Assessment   Method: Method: external tocotransducer   Frequency (min): Contraction Frequency (Minutes): x1   Ctx Count in 10 min:     Duration:     Intensity: Contraction Intensity: mild by palpation   Intensity by IUPC:     Resting Tone: Uterine Resting Tone: soft by palpation   Resting Tone by IUPC:     Jayna Units:       Assessment & Plan       Maternal care for breech presentation, single gestation    Breech presentation, single or unspecified fetus        Assessment:  1.  Intrauterine pregnancy at 39w5d gestation with reassuring fetal status.    2.  Breech presentation  3.  Obstetrical history significant for  breech presentation .  4.  GBS status:   Strep Gp B JELANI   Date Value Ref Range Status   2025 Negative Negative Final     Comment:     Centers for Disease Control and Prevention (CDC) and American Congress  of Obstetricians and Gynecologists (ACOG) guidelines for prevention of   group B streptococcal (GBS) disease specify co-collection of  a vaginal and rectal swab specimen to maximize sensitivity of GBS  detection. Per the CDC and ACOG, swabbing both the lower vagina and  rectum substantially increases the yield of detection compared with  sampling the vagina alone.  Penicillin G, ampicillin, or cefazolin are indicated for intrapartum  prophylaxis of  GBS colonization. Reflex susceptibility  testing should be performed prior to use of clindamycin only on GBS  isolates from penicillin-allergic women who are considered a high risk  for anaphylaxis. Treatment with vancomycin without additional testing  is warranted if resistance to clindamycin is noted.         Plan:  1.  Plan to proceed with primary  section due to breech presentation.  She was counseled on risks including risk of infection, bleeding, damage to surrounding structures, need for  additional surgery if injury occurs and risk of anesthesia, blood clots, heart attack, and stroke.  She understands risks and agrees to proceed.  2. Plan of care has been reviewed with patient and .  3.  Risks, benefits of treatment plan have been discussed.  4.  All questions have been answered.        Angelika Dick MD  5/20/2025  09:29 EDT

## 2025-05-20 NOTE — ANESTHESIA PROCEDURE NOTES
Spinal Block      Patient reassessed immediately prior to procedure    Patient location during procedure: OR  Start Time: 5/20/2025 10:41 AM  Stop Time: 5/20/2025 10:43 AM  Performed By  Anesthesiologist: Albertina Kingston MD  Preanesthetic Checklist  Completed: patient identified, IV checked, site marked, risks and benefits discussed, surgical consent, monitors and equipment checked, pre-op evaluation and timeout performed  Spinal Block Prep:  Patient Position:sitting  Sterile Tech:cap, gloves, mask and sterile barriers  Prep:Chloraprep  Patient Monitoring:blood pressure monitoring, continuous pulse oximetry and EKG    Spinal Block Procedure  Approach:midline  Guidance:palpation technique  Location:L4-L5  Needle Type:Braden  Needle Gauge:25 G  Placement of Spinal needle event:cerebrospinal fluid aspirated  Paresthesia: no  Fluid Appearance:clear  Medications: Morphine sulfate (PF) injection - Spinal   100 mcg - 5/20/2025 10:43:00 AM  fentaNYL citrate (PF) (SUBLIMAZE) injection - Intrathecal   10 mcg - 5/20/2025 10:43:00 AM  bupivacaine PF (MARCAINE) 0.75 % injection - Spinal   1.4 mL - 5/20/2025 10:43:00 AM   Post Assessment  Patient Tolerance:patient tolerated the procedure well with no apparent complications  Complications no

## 2025-05-20 NOTE — PLAN OF CARE
Goal Outcome Evaluation:              Outcome Evaluation: Pain controlled. VSS. F/C in place. Breastfeeding.

## 2025-05-20 NOTE — OP NOTE
Primary low-transverse  section    Indications: malpresentation: Complete breech presentation    Pre-operative Diagnosis: 1. 39 week 5 day pregnancy.  2.  Breech presentation    Post-operative Diagnosis: same    Surgeon: Angelika Dick MD     Assistants: Divya Schreiber MD   was responsible for performing the following activities: Retraction, Suction, Irrigation, and Delivery of Fetus and their skilled assistance was necessary for the success of this case.    Anesthesia: Spinal anesthesia    Procedure Details   The patient was seen in the Holding Room. The risks, benefits, complications, treatment options, and expected outcomes were discussed with the patient.  The patient concurred with the proposed plan, giving informed consent.  The site of surgery properly noted/marked. The patient was taken to Operating Room # 3, identified as Jannette Moy and the procedure verified as  Delivery. A Time Out was held and the above information confirmed.    After induction of anesthesia, the patient was draped and prepped in the usual sterile manner. A Pfannenstiel incision was made and carried down through the subcutaneous tissue to the fascia. Fascial incision was made and extended transversely. The fascia was  from the underlying rectus tissue superiorly and inferiorly. The peritoneum was identified and entered. Peritoneal incision was extended longitudinally. The utero-vesical peritoneal reflection was incised transversely and the bladder flap was bluntly freed from the lower uterine segment. A low transverse uterine incision was made. Delivered from breech presentation was a 3310 gram female with Apgar scores of 8 at one minute and 9 at five minutes.  The feet were first grasped and delivered through the hysterotomy, followed by the sacrum.  She was delivered sacrum anterior up to each axilla and each arm was swept anteriorly and delivered through the hysterotomy, and the head easily  delivered after delivery of the arms.  After the umbilical cord was clamped and cut cord blood was obtained for evaluation. The placenta was removed intact and appeared normal. Uterus was exteriorized.  The uterine outline, tubes and ovaries appeared normal. The uterine incision was closed with running locked sutures of 0 Vicryl. A second imbricating layer was placed using 0 Vicryl in a running fashion.  4 further figure-of-eight stitches of 0 Vicryl were placed at the hysterotomy for further hemostasis.  Hemostasis was observed. Lavage was carried out until clear. Uterus was returned to the patient's abdomen.  Hysterotomy was revisualized and remained hemostatic.  The peritoneum was reapproximated with 3-0 Vicryl in a running fashion.  The fascia was then reapproximated with running sutures of 0 Vicryl. The subcutaneous layer was reapproximated with 3-0 Vicryl in a running fashion.  The skin was reapproximated with 4-0 monocryl and dermabond.    Instrument, sponge, and needle counts were correct prior the abdominal closure and at the conclusion of the case.       Findings:  Normal pelvic anatomy    Quantitative blood Loss:  929 mL           Drains: Peralta, draining clear urine           Specimens: Placenta, discarded           Implants: None           Complications:  None; patient tolerated the procedure well.           Disposition: PACU - hemodynamically stable.           Condition: stable    Attending Attestation: I was present and scrubbed for the entire procedure.

## 2025-05-20 NOTE — PLAN OF CARE
Problem:  Delivery  Goal: Bleeding is Controlled  Outcome: Met  Goal: Stable Fetal Wellbeing  Outcome: Met  Intervention: Promote and Monitor Fetal Wellbeing  Recent Flowsheet Documentation  Taken 2025 0710 by Aaliyah Guillen RN  Body Position: sitting up in bed  Goal: Absence of Infection Signs and Symptoms  Outcome: Met  Goal: Effective Oxygenation and Ventilation  Outcome: Met     Problem: Adult Inpatient Plan of Care  Goal: Plan of Care Review  Flowsheets (Taken 2025 1158)  Progress: improving  Outcome Evaluation:   Patient admitted for Primary C/S due to Breech presentation. Procedure was uneventful and patient is in recovery. Fundal exams are firm, midline, at the umbilicus, with scant to light bleeding. VSS   denies pain at this time. Breastfeeding independently at this time. Will transfer to Postpartum unit after Recovery.  Plan of Care Reviewed With:   patient   spouse  Goal: Absence of Hospital-Acquired Illness or Injury  Intervention: Identify and Manage Fall Risk  Recent Flowsheet Documentation  Taken 2025 0710 by Aaliyah Guillen RN  Safety Promotion/Fall Prevention: safety round/check completed  Intervention: Prevent Skin Injury  Recent Flowsheet Documentation  Taken 2025 0710 by Aaliyah Guillen RN  Body Position: sitting up in bed  Intervention: Prevent and Manage VTE (Venous Thromboembolism) Risk  Recent Flowsheet Documentation  Taken 2025 1146 by Aaliyah Guillen RN  VTE Prevention/Management:   bilateral   SCDs (sequential compression devices) on  Goal: Optimal Comfort and Wellbeing  Intervention: Provide Person-Centered Care  Recent Flowsheet Documentation  Taken 2025 1146 by Aaliyah Guillen RN  Trust Relationship/Rapport:   care explained   choices provided   emotional support provided   empathic listening provided   questions answered   questions encouraged   reassurance provided   thoughts/feelings acknowledged  Taken 2025 0710 by Aaliyah Guillen  RN  Trust Relationship/Rapport:   care explained   choices provided   emotional support provided   empathic listening provided   questions answered   questions encouraged   reassurance provided   thoughts/feelings acknowledged     Problem: Postpartum ( Delivery)  Goal: Anesthesia/Sedation Recovery  Intervention: Optimize Anesthesia Recovery  Recent Flowsheet Documentation  Taken 2025 6483 by Aaliyah Guillen RN  Safety Promotion/Fall Prevention: safety round/check completed   Goal Outcome Evaluation:  Plan of Care Reviewed With: patient, spouse        Progress: improving  Outcome Evaluation: Patient admitted for Primary C/S due to Breech presentation. Procedure was uneventful and patient is in recovery. Fundal exams are firm, midline, at the umbilicus, with scant to light bleeding. VSS; denies pain at this time. Breastfeeding independently at this time. Will transfer to Postpartum unit after Recovery.

## 2025-05-20 NOTE — LACTATION NOTE
This note was copied from a baby's chart.  P2 Term.  Reports BF first for 1 month, then exclusively pumped for 1 year and had a good supply.  Reports baby is BF well so far.  Educated on colostrum first few days, to monitor hunger cues and latch on demand every 2-3 hours, expected output, and how to tell if baby is getting enough.  Baby is awake showing hunger cues now.  Educated on positioning, importance of a deep latch, and how to achieve it.  Mom attempted to latch baby to both breasts in cross cradle and football holds but baby was fussy at breast.  Recommended to do STS now and try again once baby is more calm.  LC number on whiteboard.  Encouraged to call when needed.  Has a personal pump.

## 2025-05-20 NOTE — ANESTHESIA PREPROCEDURE EVALUATION
" Anesthesia Evaluation     Patient summary reviewed and Nursing notes reviewed   NPO Solid Status: > 8 hours  NPO Liquid Status: > 2 hours           Airway   Mallampati: II  TM distance: >3 FB  Neck ROM: full  No difficulty expected  Dental      Pulmonary    Cardiovascular         Neuro/Psych  GI/Hepatic/Renal/Endo    (+) obesity    Musculoskeletal     Abdominal    Substance History      OB/GYN    (+) Pregnant        Other        ROS/Med Hx Other: Breech presentation                  Anesthesia Plan    ASA 2     spinal     (I have reviewed the patient's history with the patient and the chart, including all pertinent laboratory results and imaging. I have explained the risks of spinal anesthesia including but not limited to hypotension, PDPH, nerve injury and risk of converting to GA. Patient understands risks and agrees to proceed.      /74   Pulse 89   Ht 157.5 cm (62\")   Wt 92.1 kg (203 lb)   LMP 08/28/2024 (Within Days)   Breastfeeding No   BMI 37.13 kg/m²   )    Anesthetic plan, risks, benefits, and alternatives have been provided, discussed and informed consent has been obtained with: patient.    CODE STATUS:    Code Status (Patient has no pulse and is not breathing): CPR (Attempt to Resuscitate)  Medical Interventions (Patient has pulse or is breathing): Full Support  Level Of Support Discussed With: Patient      "

## 2025-05-21 LAB
BASOPHILS # BLD AUTO: 0.05 10*3/MM3 (ref 0–0.2)
BASOPHILS NFR BLD AUTO: 0.4 % (ref 0–1.5)
DEPRECATED RDW RBC AUTO: 41.5 FL (ref 37–54)
EOSINOPHIL # BLD AUTO: 0.16 10*3/MM3 (ref 0–0.4)
EOSINOPHIL NFR BLD AUTO: 1.3 % (ref 0.3–6.2)
ERYTHROCYTE [DISTWIDTH] IN BLOOD BY AUTOMATED COUNT: 12.7 % (ref 12.3–15.4)
HCT VFR BLD AUTO: 32.9 % (ref 34–46.6)
HGB BLD-MCNC: 11.3 G/DL (ref 12–15.9)
IMM GRANULOCYTES # BLD AUTO: 0.12 10*3/MM3 (ref 0–0.05)
IMM GRANULOCYTES NFR BLD AUTO: 1 % (ref 0–0.5)
LYMPHOCYTES # BLD AUTO: 1.86 10*3/MM3 (ref 0.7–3.1)
LYMPHOCYTES NFR BLD AUTO: 15.3 % (ref 19.6–45.3)
MCH RBC QN AUTO: 30.5 PG (ref 26.6–33)
MCHC RBC AUTO-ENTMCNC: 34.3 G/DL (ref 31.5–35.7)
MCV RBC AUTO: 88.7 FL (ref 79–97)
MONOCYTES # BLD AUTO: 1.25 10*3/MM3 (ref 0.1–0.9)
MONOCYTES NFR BLD AUTO: 10.3 % (ref 5–12)
NEUTROPHILS NFR BLD AUTO: 71.7 % (ref 42.7–76)
NEUTROPHILS NFR BLD AUTO: 8.73 10*3/MM3 (ref 1.7–7)
PLATELET # BLD AUTO: 140 10*3/MM3 (ref 140–450)
PMV BLD AUTO: 11.5 FL (ref 6–12)
RBC # BLD AUTO: 3.71 10*6/MM3 (ref 3.77–5.28)
WBC NRBC COR # BLD AUTO: 12.17 10*3/MM3 (ref 3.4–10.8)

## 2025-05-21 PROCEDURE — 85025 COMPLETE CBC W/AUTO DIFF WBC: CPT | Performed by: OBSTETRICS & GYNECOLOGY

## 2025-05-21 PROCEDURE — 25010000002 KETOROLAC TROMETHAMINE PER 15 MG: Performed by: OBSTETRICS & GYNECOLOGY

## 2025-05-21 PROCEDURE — 25010000002 ENOXAPARIN PER 10 MG: Performed by: OBSTETRICS & GYNECOLOGY

## 2025-05-21 PROCEDURE — 0503F POSTPARTUM CARE VISIT: CPT

## 2025-05-21 RX ORDER — POLYETHYLENE GLYCOL 3350 17 G/17G
17 POWDER, FOR SOLUTION ORAL DAILY
Status: DISCONTINUED | OUTPATIENT
Start: 2025-05-21 | End: 2025-05-22 | Stop reason: HOSPADM

## 2025-05-21 RX ADMIN — ENOXAPARIN SODIUM 40 MG: 100 INJECTION SUBCUTANEOUS at 14:04

## 2025-05-21 RX ADMIN — ACETAMINOPHEN 1000 MG: 500 TABLET, FILM COATED ORAL at 06:11

## 2025-05-21 RX ADMIN — DOCUSATE SODIUM 100 MG: 100 CAPSULE, LIQUID FILLED ORAL at 19:54

## 2025-05-21 RX ADMIN — KETOROLAC TROMETHAMINE 15 MG: 15 INJECTION, SOLUTION INTRAMUSCULAR; INTRAVENOUS at 07:51

## 2025-05-21 RX ADMIN — PRENATAL VITAMINS-IRON FUMARATE 27 MG IRON-FOLIC ACID 0.8 MG TABLET 1 TABLET: at 07:51

## 2025-05-21 RX ADMIN — KETOROLAC TROMETHAMINE 15 MG: 15 INJECTION, SOLUTION INTRAMUSCULAR; INTRAVENOUS at 16:36

## 2025-05-21 RX ADMIN — ACETAMINOPHEN 650 MG: 325 TABLET, FILM COATED ORAL at 19:54

## 2025-05-21 RX ADMIN — KETOROLAC TROMETHAMINE 15 MG: 15 INJECTION, SOLUTION INTRAMUSCULAR; INTRAVENOUS at 02:02

## 2025-05-21 RX ADMIN — ACETAMINOPHEN 650 MG: 325 TABLET, FILM COATED ORAL at 14:04

## 2025-05-21 RX ADMIN — POLYETHYLENE GLYCOL 3350 17 G: 17 POWDER, FOR SOLUTION ORAL at 10:07

## 2025-05-21 RX ADMIN — IBUPROFEN 600 MG: 600 TABLET ORAL at 22:15

## 2025-05-21 NOTE — LACTATION NOTE
This note was copied from a baby's chart.  P2 term baby, 22hrs old. Mom reports baby has been nursing well with 2 wet and 3 stools so far today.  Reviewed milk production, how to know baby is getting enough milk, feeding cues, expected output, nursing on demand or every 3hrs and encouraged to call for any assistance.

## 2025-05-21 NOTE — PLAN OF CARE
Goal Outcome Evaluation:              Outcome Evaluation: VSS. Voiding without difficulty. Breastfeeding. Ambulating well.

## 2025-05-21 NOTE — ANESTHESIA POSTPROCEDURE EVALUATION
Patient: Jannette Moy    Procedure Summary       Date: 25 Room / Location:  [facility] /  [facility]    Anesthesia Start: 1040 Anesthesia Stop: 1145    Procedure:  SECTION PRIMARY (Abdomen) Diagnosis:       Maternal care for breech presentation, single gestation      (Maternal care for breech presentation, single gestation [O32.1XX0])    Surgeons: Angelika Dick MD Provider: Albertina Kingston MD    Anesthesia Type: spinal ASA Status: 2            Anesthesia Type: spinal    Vitals  Vitals Value Taken Time   /66 25 19:16   Temp 36.7 °C (98 °F) 25 19:16   Pulse 88 25 19:16   Resp 16 25 19:16   SpO2 96 % 25 18:38           Post Anesthesia Care and Evaluation    Patient location during evaluation: bedside  Patient participation: complete - patient participated  Level of consciousness: awake and alert  Pain management: adequate    Airway patency: patent  Anesthetic complications: No anesthetic complications  PONV Status: controlled  Cardiovascular status: acceptable  Respiratory status: acceptable  Hydration status: acceptable

## 2025-05-21 NOTE — PLAN OF CARE
Problem: Adult Inpatient Plan of Care  Goal: Plan of Care Review  Outcome: Progressing  Flowsheets  Taken 2025 by Maggie Plasencia RN  Progress: improving  Outcome Evaluation: 0po, VSS, assessment wnl, FC in place with adequate output, lochia scant, ambulating independently, pain well controlled with eras, breastfeeding, discussed plan of care  Taken 2025 1158 by Aaliyah Guillen RN  Plan of Care Reviewed With:   patient   spouse  Goal: Patient-Specific Goal (Individualized)  Outcome: Progressing  Goal: Absence of Hospital-Acquired Illness or Injury  Outcome: Progressing  Intervention: Identify and Manage Fall Risk  Recent Flowsheet Documentation  Taken 2025 by Maggie Plasencia RN  Safety Promotion/Fall Prevention: safety round/check completed  Intervention: Prevent Skin Injury  Recent Flowsheet Documentation  Taken 2025 by Maggie Plasencia RN  Body Position: position changed independently  Intervention: Prevent and Manage VTE (Venous Thromboembolism) Risk  Recent Flowsheet Documentation  Taken 2025 by Maggie Plasencia RN  VTE Prevention/Management: (pt ambulating) SCDs (sequential compression devices) off  Goal: Optimal Comfort and Wellbeing  Outcome: Progressing  Intervention: Provide Person-Centered Care  Recent Flowsheet Documentation  Taken 2025 by Maggie Plasencia RN  Trust Relationship/Rapport:   care explained   choices provided   questions answered   questions encouraged  Goal: Readiness for Transition of Care  Outcome: Progressing     Problem: Postpartum ( Delivery)  Goal: Successful Parent Role Transition  Outcome: Progressing  Goal: Hemostasis  Outcome: Progressing  Goal: Effective Bowel Elimination  Outcome: Progressing  Goal: Fluid and Electrolyte Balance  Outcome: Progressing  Goal: Absence of Infection Signs and Symptoms  Outcome: Progressing  Goal: Anesthesia/Sedation Recovery  Outcome: Progressing  Intervention: Optimize  Anesthesia Recovery  Recent Flowsheet Documentation  Taken 5/20/2025 2005 by Maggie Plasencia, RN  Safety Promotion/Fall Prevention: safety round/check completed  Goal: Optimal Pain Control and Function  Outcome: Progressing  Intervention: Prevent or Manage Pain  Recent Flowsheet Documentation  Taken 5/20/2025 2005 by Maggie Plasencia, RN  Perineal Care: absorbent brief/pad changed  Goal: Nausea and Vomiting Relief  Outcome: Progressing  Goal: Effective Urinary Elimination  Outcome: Progressing  Goal: Effective Oxygenation and Ventilation  Outcome: Progressing  Intervention: Optimize Oxygenation and Ventilation  Recent Flowsheet Documentation  Taken 5/20/2025 2005 by Maggie Plasencia, RN  Head of Bed (HOB) Positioning: HOB elevated     Problem: Breastfeeding  Goal: Effective Breastfeeding  Outcome: Progressing     Problem: Skin Injury Risk Increased  Goal: Skin Health and Integrity  Outcome: Progressing  Intervention: Optimize Skin Protection  Recent Flowsheet Documentation  Taken 5/20/2025 2005 by Maggie Plasencia, RN  Activity Management:   up ad kennedy   ambulated in room  Head of Bed (HOB) Positioning: HOB elevated   Goal Outcome Evaluation:           Progress: improving  Outcome Evaluation: 0po, VSS, assessment wnl, FC in place with adequate output, lochia scant, ambulating independently, pain well controlled with eras, breastfeeding, discussed plan of care

## 2025-05-21 NOTE — PROGRESS NOTES
" PROGRESS NOTE:   POSTOP DAY 1      PATIENT: Jannette Moy        MR#:[id number]  LOCATION: [facility]  DATE OF ADMISSION: 2025  ADMISSION  DIAGNOSIS:   S/P  section    Maternal care for breech presentation, single gestation    Breech presentation, single or unspecified fetus     CURRENT DIAGNOSIS: No notes have been filed under this hospital service.  Service: Hospitalist    SERVICE: Obstetrics      S/P  section    Maternal care for breech presentation, single gestation    Breech presentation, single or unspecified fetus        PROCEDURES:  , Low Transverse    2025   11:01 AM     ASSESSMENT/PLAN  Status Post  Delivery Day 1:   Postpartum care immediately following  delivery: Doing well postoperatively. Continue routine postoperative and supportive care. Discontinue IV, advance diet, may shower.  DVT Prophylaxis: BMI 37.13. SCD's while at rest. On lovenox for DVT prevention. Encouraged ambulation.  Postpartum anemia: hgb prior to delivery 14.2, 11.3 on postpartum.  mL. Asymptomatic.  Continue prenatal vitamin with daily medications.       RH STATUS: O positive  SYPHILIS SCREEN DELIVERY ADMIT: treponemal antibody non-reactive upon admission  RUBELLA: immune  VARICELLA: unknown immunity  INFANT GENDER: female - \"Miley\"    Subjective    29 y.o. yo Female  status post  section day 1 at 39w5d doing well. Jannette denies any emotional concerns. Pain well controlled with current medications. Lochia appropriate. She is tolerating a regular diet and passing flatus. Urinary output has been adequate.         Objective   Vitals  Temp:  Temp:  [97.4 °F (36.3 °C)-98.6 °F (37 °C)] 97.9 °F (36.6 °C)  Temp src: Oral  BP:  BP: ()/(53-75) 97/61  Pulse:  Heart Rate:  [68-93] 83  RR:   Resp:  [16-18] 16    General:  Awake, alert, no acute distress   Cardiac: Regular rate and rhythm    Respiratory: Lungs clear bilaterally, normal " respiratory effort    Abdomen: Soft, non-distended, fundus firm, below umbilicus, appropriately tender   Incision: Healing well, no dehiscence, no significant drainage, no erythema or exudate   Pelvis: deferred   Extremities: Calves NT bilaterally, DTR 2+, no clonus noted, trace edema     I/O last 3 completed shifts:  In: 1357 [I.V.:1357]  Out: 3304 [Urine:2375; Blood:929]  Lab Results   Component Value Date    WBC 12.17 (H) 05/21/2025    HGB 11.3 (L) 05/21/2025    HCT 32.9 (L) 05/21/2025    MCV 88.7 05/21/2025     05/21/2025    CREATININE 0.81 09/21/2022    AST 24 09/21/2022    ALT 33 09/21/2022    HEPBSAG Negative 10/29/2024     Results from last 7 days   Lab Units 05/20/25  0655   ABO TYPING  O   RH TYPING  Positive   ANTIBODY SCREEN  Negative       Sharon Barahona CNM  5/21/2025   08:38 EDT

## 2025-05-22 VITALS
DIASTOLIC BLOOD PRESSURE: 67 MMHG | TEMPERATURE: 98.3 F | RESPIRATION RATE: 16 BRPM | OXYGEN SATURATION: 95 % | HEIGHT: 62 IN | WEIGHT: 203 LBS | BODY MASS INDEX: 37.36 KG/M2 | SYSTOLIC BLOOD PRESSURE: 105 MMHG | HEART RATE: 72 BPM

## 2025-05-22 PROCEDURE — 0503F POSTPARTUM CARE VISIT: CPT

## 2025-05-22 RX ORDER — PSEUDOEPHEDRINE HCL 30 MG
100 TABLET ORAL 2 TIMES DAILY PRN
Qty: 60 CAPSULE | Refills: 2 | Status: SHIPPED | OUTPATIENT
Start: 2025-05-22

## 2025-05-22 RX ORDER — POLYETHYLENE GLYCOL 3350 17 G/17G
17 POWDER, FOR SOLUTION ORAL DAILY
Qty: 510 G | Refills: 2 | Status: SHIPPED | OUTPATIENT
Start: 2025-05-23

## 2025-05-22 RX ORDER — OXYCODONE AND ACETAMINOPHEN 5; 325 MG/1; MG/1
1 TABLET ORAL EVERY 6 HOURS PRN
Qty: 9 TABLET | Refills: 0 | Status: SHIPPED | OUTPATIENT
Start: 2025-05-22

## 2025-05-22 RX ORDER — IBUPROFEN 600 MG/1
600 TABLET, FILM COATED ORAL EVERY 6 HOURS
Qty: 60 TABLET | Refills: 0 | Status: SHIPPED | OUTPATIENT
Start: 2025-05-22

## 2025-05-22 RX ADMIN — IBUPROFEN 600 MG: 600 TABLET ORAL at 06:39

## 2025-05-22 RX ADMIN — ACETAMINOPHEN 650 MG: 325 TABLET, FILM COATED ORAL at 09:04

## 2025-05-22 RX ADMIN — ACETAMINOPHEN 650 MG: 325 TABLET, FILM COATED ORAL at 03:32

## 2025-05-22 RX ADMIN — POLYETHYLENE GLYCOL 3350 17 G: 17 POWDER, FOR SOLUTION ORAL at 09:04

## 2025-05-22 RX ADMIN — DOCUSATE SODIUM 100 MG: 100 CAPSULE, LIQUID FILLED ORAL at 09:04

## 2025-05-22 RX ADMIN — PRENATAL VITAMINS-IRON FUMARATE 27 MG IRON-FOLIC ACID 0.8 MG TABLET 1 TABLET: at 09:04

## 2025-05-22 NOTE — DISCHARGE INSTRUCTIONS
Call your OB for any questions or concerns! In case of emergencies, any shortness of breath at rest, or chest pain call 911 or report to nearest emergency department.    Activity:  - Complete vaginal rest for 6 weeks - nothing inside the vagina including tampons.  - No tub baths, swimming pools, submerging in any kind of water. Continue perineal care with mauricio bottle using clean water after urination and bowel movements. Sitz baths with water up to hips can be taken.   - No driving for 1 week or until no longer taking narcotic medications.  - No heavy lifting. Gradually get into normal routine. No exercise until cleared by MD.   Incisional Care:   - You are allowed to shower. Do not scrub incision. Let warm soap water flow over incision. Pat incision dry with a clean towel. Keep incision clean and dry.  - Do not apply lotion or any topicals unless recommended by physician.  - Signs of infection to report: increased pain, redness, warmth, swelling, drainage (foul odor)  - BRIANA Dressing (if used) should have a green light flashing OK. When taking a shower unscrew the device and cover the end of the tube. After shower, reapply device to tubing and ensure the OK is flashing up green. The dressing can get wet.   Report to Doctor:  - Heavy bleeding (saturating 1 pad/hour for 2 consecutive hours or passing large sized clots the size of your fist). Bleeding may last up to 6 weeks.  - Severe pain that does not get better with pain medication  - Foul smelling vaginal discharge  - Severe nausea/vomiting  - Severe headache, vision changes, epigastric pain - may be related to your blood pressure which can be a postpartum emergency.   - S/sx of DVT (blood clot): swelling, redness, warmth, pain behind calf (usually 1-sided)   Breastfeeding:   - Milk takes 3-5 days to come in. Continue latching baby or pumping every 3 hours to initiate stimulation to help milk supply.    - Feed baby every 2-3 hours or on demand.   - Mastitis is an  infection of the breast that can occur during breastfeeding. Signs and symptoms include: fever > 101, red streaks at breast. Report to your doctor so treatment can be started.  - Decreasing Milk Supply: Wear tight fitting bras, ice packs, and keep back to shower. Decrease breast stimulation.   - For more breastfeeding information refer to your postpartum booklet page 35-43.     For more postpartum and  care information, refer to Your Guide to Postpartum & [place] Care booklet received at admission or reach out to your doctor.

## 2025-05-22 NOTE — DISCHARGE SUMMARY
" SECTION DISCHARGE SUMMARY    PATIENT: Jannette Moy        MR#:[id number]  LOCATION: [facility]  ADMISSION  DIAGNOSIS: S/P  section  DISCHARGE DIAGNOSIS:   1. Maternal care for breech presentation, single gestation      SERVICE: Obstetrics    DATE OF ADMISSION: 2025  DATE OF DISCHARGE:  25       S/P  section    Maternal care for breech presentation, single gestation    Breech presentation, single or unspecified fetus        PROCEDURES:  , Low Transverse    2025   11:01 AM     RH STATUS: O positive  SYPHILIS SCREEN DELIVERY ADMIT: treponemal antibody non-reactive upon admission  RUBELLA: immune  VARICELLA: unknown immunity  INFANT GENDER: female - \"Miley\"    SERVICE: Obstetrics    HOSPITAL COURSE:  Jannette underwent  section of a female infant and remained in the hospital for 2 days. During that time, Jannette remained afebrile and hemodynamically stable. On the day of discharge, Jannette was eating, ambulating, passing flatus, and voiding without difficulty.  Her hemoglobin was 11.3 postpartum.     LABS:    Lab Results (last 24 hours)       ** No results found for the last 24 hours. **            DISCHARGE MEDICATIONS     Discharge Medications        ASK your doctor about these medications        Instructions Start Date   prenatal vitamin 27-0.8 27-0.8 MG tablet tablet   1 tablet, Daily               DISCHARGE DISPOSITION:  Home    DISCHARGE CONDITION: Stable    DISCHARGE DIET: Regular    ACTIVITY AT DISCHARGE: Pelvic rest    INFANT FEEDING PLANS: Breast    EDUCATION: Warning signs and symptoms given, no tub baths, nothing in the vagina for 6 weeks, no driving for 2 weeks while on narcotics.     FOLLOW-UP APPOINTMENTS: Follow up with [facility] in 2 weeks for incision check with Dr. Dick   in 4 to 6 weeks for routine postpartum visit.    Sharon Barahona CNM  25  08:22 EDT    "

## 2025-05-22 NOTE — PLAN OF CARE
Problem: Adult Inpatient Plan of Care  Goal: Plan of Care Review  Outcome: Progressing  Flowsheets  Taken 2025 by Maggie Plasencia RN  Progress: improving  Outcome Evaluation: vss, assessment wnl, voiding spontaneously, lochia scant, ambulating independently, pain well controlled with ERAS, breastfeeding without difficulty, plans for dc in am  Taken 2025 1158 by Aaliyah Guillen RN  Plan of Care Reviewed With:   patient   spouse  Goal: Patient-Specific Goal (Individualized)  Outcome: Progressing  Goal: Absence of Hospital-Acquired Illness or Injury  Outcome: Progressing  Intervention: Identify and Manage Fall Risk  Recent Flowsheet Documentation  Taken 2025 by Maggie Plasencia RN  Safety Promotion/Fall Prevention: safety round/check completed  Taken 2025 by Maggie Plasencia RN  Safety Promotion/Fall Prevention: safety round/check completed  Intervention: Prevent Skin Injury  Recent Flowsheet Documentation  Taken 2025 by Maggie Plasencia RN  Body Position: position changed independently  Goal: Optimal Comfort and Wellbeing  Outcome: Progressing  Intervention: Monitor Pain and Promote Comfort  Recent Flowsheet Documentation  Taken 2025 by Maggie Plasencia RN  Pain Management Interventions:   pain medication given   cold applied  Intervention: Provide Person-Centered Care  Recent Flowsheet Documentation  Taken 2025 by Maggie Plasencia RN  Trust Relationship/Rapport:   care explained   choices provided   questions answered   questions encouraged  Goal: Readiness for Transition of Care  Outcome: Progressing     Problem: Postpartum ( Delivery)  Goal: Successful Parent Role Transition  Outcome: Progressing  Goal: Hemostasis  Outcome: Progressing  Goal: Effective Bowel Elimination  Outcome: Progressing  Goal: Fluid and Electrolyte Balance  Outcome: Progressing  Goal: Absence of Infection Signs and Symptoms  Outcome: Progressing  Goal:  Anesthesia/Sedation Recovery  Outcome: Progressing  Intervention: Optimize Anesthesia Recovery  Recent Flowsheet Documentation  Taken 5/21/2025 1954 by Maggie Plasencia, RN  Safety Promotion/Fall Prevention: safety round/check completed  Taken 5/21/2025 1910 by Maggie Plsaencia RN  Safety Promotion/Fall Prevention: safety round/check completed  Goal: Optimal Pain Control and Function  Outcome: Progressing  Intervention: Prevent or Manage Pain  Recent Flowsheet Documentation  Taken 5/21/2025 1954 by Maggie Plasencia RN  Pain Management Interventions:   pain medication given   cold applied  Goal: Nausea and Vomiting Relief  Outcome: Progressing  Goal: Effective Urinary Elimination  Outcome: Progressing  Goal: Effective Oxygenation and Ventilation  Outcome: Progressing  Intervention: Optimize Oxygenation and Ventilation  Recent Flowsheet Documentation  Taken 5/21/2025 1954 by Maggie Plasencia RN  Head of Bed (HOB) Positioning: HOB elevated     Problem: Breastfeeding  Goal: Effective Breastfeeding  Outcome: Progressing     Problem: Skin Injury Risk Increased  Goal: Skin Health and Integrity  Outcome: Progressing  Intervention: Optimize Skin Protection  Recent Flowsheet Documentation  Taken 5/21/2025 1954 by Maggie Plasencia RN  Activity Management: up ad kennedy  Head of Bed (HOB) Positioning: HOB elevated   Goal Outcome Evaluation:           Progress: improving  Outcome Evaluation: vss, assessment wnl, voiding spontaneously, lochia scant, ambulating independently, pain well controlled with ERAS, breastfeeding without difficulty, plans for dc in am

## 2025-05-22 NOTE — PROGRESS NOTES
" PROGRESS NOTE:   POSTOP DAY 2      PATIENT: Jannette Moy        MR#:[id number]  LOCATION: [facility]  DATE OF ADMISSION: 2025  ADMISSION  DIAGNOSIS:   S/P  section    Maternal care for breech presentation, single gestation    Breech presentation, single or unspecified fetus     CURRENT DIAGNOSIS: No notes have been filed under this hospital service.  Service: Hospitalist    SERVICE: Obstetrics      S/P  section    Maternal care for breech presentation, single gestation    Breech presentation, single or unspecified fetus        PROCEDURES:  , Low Transverse    2025   11:01 AM       Status Post  Delivery Day 2:   Postpartum care immediately following  delivery day 2: Doing well postoperatively. Continue routine postoperative and supportive care. Discontinue IV, advance diet, may shower. Requests discharge home today. Meeting all milestones for discharge.   DVT Prophylaxis: BMI 37.13. SCD's while at rest. On lovenox for DVT prevention. Encouraged ambulation.  Postpartum anemia: hgb prior to delivery 14.2, 11.3 on postpartum.  mL. Asymptomatic.  Continue prenatal vitamin with daily medications.   Discharge to home: Discharge instructions given, precautions reviewed. Follow up with [facility] in 2 weeks for incision check with Dr. Dick   in 4 to 6 weeks for routine postpartum visit. Prescription for ibuprofen 600mg PO every 6 hours PRN for pain, docusate 100mg PO BID, and oxycodone/acetaminophen 5/325 every 6 hours PRN for pain. Advised no tampons, menstrual cups, intercourse, or tub baths for 6 weeks. No driving for 2 weeks.    RH STATUS: O positive  SYPHILIS SCREEN DELIVERY ADMIT: treponemal antibody non-reactive upon admission  RUBELLA: immune  VARICELLA: unknown immunity  INFANT GENDER: female - \"Miley\"    Subjective     29 y.o. yo Female  status post  section day 2 at 39w5d doing well. Pain well controlled. Lochia " appropriate. The patient requests discharge home today.     Objective   Vitals  Temp:  Temp:  [97.7 °F (36.5 °C)-98.3 °F (36.8 °C)] 98.3 °F (36.8 °C)  Temp src: Oral  BP:  BP: ()/(56-67) 105/67  Pulse:  Heart Rate:  [72-88] 72  RR:   Resp:  [16] 16    General:  Awake, alert, no acute distress   Cardiac: Regular rate and rhythm    Respiratory: Lungs clear bilaterally, normal respiratory effort    Abdomen: Soft, non-distended, fundus firm, below umbilicus, appropriately tender   Incision: Healing well, no dehiscence, no significant drainage, no erythema or exudate   Pelvis: deferred   Extremities: Calves NT bilaterally, DTR 2+, no clonus noted, trace edema     I/O last 3 completed shifts:  In: 1900 [P.O.:1900]  Out: 4200 [Urine:4200]  Lab Results   Component Value Date    WBC 12.17 (H) 05/21/2025    HGB 11.3 (L) 05/21/2025    HCT 32.9 (L) 05/21/2025    MCV 88.7 05/21/2025     05/21/2025    CREATININE 0.81 09/21/2022    AST 24 09/21/2022    ALT 33 09/21/2022    HEPBSAG Negative 10/29/2024     Results from last 7 days   Lab Units 05/20/25  0655   ABO TYPING  O   RH TYPING  Positive   ANTIBODY SCREEN  Negative           Sharon Barahona CNM  5/22/2025   08:22 EDT

## 2025-05-23 ENCOUNTER — MATERNAL SCREENING (OUTPATIENT)
Dept: CALL CENTER | Facility: HOSPITAL | Age: 30
End: 2025-05-23
Payer: COMMERCIAL

## 2025-05-23 NOTE — OUTREACH NOTE
Maternal Screening Survey      Flowsheet Row Responses   Eligibility Eligible   Prep survey completed? Yes   Facility patient discharged from? [facility] - Registered Nurse

## 2025-06-03 ENCOUNTER — MATERNAL SCREENING (OUTPATIENT)
Dept: CALL CENTER | Facility: HOSPITAL | Age: 30
End: 2025-06-03
Payer: COMMERCIAL

## 2025-06-03 ENCOUNTER — POSTPARTUM VISIT (OUTPATIENT)
Dept: OBSTETRICS AND GYNECOLOGY | Facility: CLINIC | Age: 30
End: 2025-06-03
Payer: COMMERCIAL

## 2025-06-03 VITALS
WEIGHT: 191 LBS | SYSTOLIC BLOOD PRESSURE: 110 MMHG | HEIGHT: 62 IN | BODY MASS INDEX: 35.15 KG/M2 | DIASTOLIC BLOOD PRESSURE: 72 MMHG

## 2025-06-03 DIAGNOSIS — Z98.891 S/P CESAREAN SECTION: ICD-10-CM

## 2025-06-03 NOTE — OUTREACH NOTE
Maternal Screening Survey      Flowsheet Row Responses   [facility]   [facility]? Yes   Call start time 1007   Call end time 1010   I have been able to laugh and see the funny side of things. 0   I have looked forward with enjoyment to things. 0   I have blamed myself unnecessarily when things went wrong. 0   I have been anxious or worried for no good reason. 1   I have felt scared or panicky for no good reason. 0   Things have been getting on top of me. 0   I have been so unhappy that I have had difficulty sleeping. 0   I have felt sad or miserable. 0   I have been so unhappy that I have been crying. 0   The thought of harming myself has occurred to me. 0   [place]  Depression Scale Total 1   Did any of your parents have problems with alcohol or drug use? No   Do any of your peers have problems with alcohol or drug use? No   Does your partner have problems with alcohol or drug use? No   Before you were pregnant did you have problems with alcohol or drug use? (past) No   In the past month, did you drink beer, wine, liquor or use any other drugs? (pregnancy) No   Maternal Screening call completed Yes   Call end time 1010              Michelle SHEFFIELD - Registered Nurse

## 2025-06-03 NOTE — PROGRESS NOTES
"Chief Complaint   Patient presents with    Postpartum Care     Pt here today for 2wk pp, c-sec del , breastfeeding         SUBJECTIVE:   Jannette Moy is a 29 y.o.  who presents s/p  Primary Low Transverse  Section on 25. Her pregnancy was complicated by breech presentation. Her postpartum course has been uncomplicated. Her pain is resolved. Vaginal bleeding is light. Bowel and bladder function have returned to normal. She is breast feeding. Mood changes: denies    History reviewed. No pertinent past medical history.  Past Surgical History:   Procedure Laterality Date     SECTION N/A 2025    Procedure:  SECTION PRIMARY;  Surgeon: Angelika Dick MD;  Location: [facility] LABOR DELIVERY;  Service: Obstetrics/Gynecology;  Laterality: N/A;    TONSILLECTOMY      WISDOM TOOTH EXTRACTION         Patient Active Problem List   Diagnosis    Pregnancy    Placenta succenturiate lobe affecting fetus    Maternal care for breech presentation, single gestation    Breech presentation, single or unspecified fetus    S/P  section        OBJECTIVE:   /72   Ht 157.5 cm (62\")   Wt 86.6 kg (191 lb)   LMP 2024 (Within Days)   Breastfeeding Yes   BMI 34.93 kg/m²      Physical Exam  Constitutional:       General: She is not in acute distress.     Appearance: Normal appearance. She is obese. She is not ill-appearing, toxic-appearing or diaphoretic.   Cardiovascular:      Rate and Rhythm: Normal rate.   Pulmonary:      Effort: Pulmonary effort is normal.   Abdominal:      General: There is no distension.      Palpations: Abdomen is soft. There is no mass.      Tenderness: There is no abdominal tenderness. There is no guarding.      Hernia: No hernia is present.   Musculoskeletal:         General: Normal range of motion.      Cervical back: Normal range of motion.   Neurological:      General: No focal deficit present.      Mental Status: She is alert and oriented to " person, place, and time.   Skin:     General: Skin is warm and dry.      Comments: Low transverse incision is well healed   Psychiatric:         Mood and Affect: Mood normal.         Behavior: Behavior normal.         Thought Content: Thought content normal.         Judgment: Judgment normal.   Vitals and nursing note reviewed.       Lab Results   Component Value Date    WBC 12.17 (H) 2025    HGB 11.3 (L) 2025    HCT 32.9 (L) 2025    MCV 88.7 2025     2025        ASSESSMENT:   Diagnoses and all orders for this visit:    1. Postpartum follow-up (Primary)    2. S/P  section        PLAN:   Doing very well postpartum  Low transverse incision is well healed  Baby is doing well  At this time, continue pelvic rest and lifting precautions.  Reviewed last pap smear  NIL    Return in about 4 weeks (around 2025) for Postpartum follow up with Dr Dick or myself.    Annamaria Roe, APRN  6/3/2025  13:08 EDT

## 2025-07-02 ENCOUNTER — POSTPARTUM VISIT (OUTPATIENT)
Dept: OBSTETRICS AND GYNECOLOGY | Facility: CLINIC | Age: 30
End: 2025-07-02
Payer: COMMERCIAL

## 2025-07-02 VITALS
DIASTOLIC BLOOD PRESSURE: 76 MMHG | BODY MASS INDEX: 35 KG/M2 | SYSTOLIC BLOOD PRESSURE: 110 MMHG | HEIGHT: 62 IN | WEIGHT: 190.2 LBS | HEART RATE: 65 BPM

## 2025-07-02 PROBLEM — Z34.90 PREGNANCY: Status: RESOLVED | Noted: 2022-09-16 | Resolved: 2025-07-02

## 2025-07-02 NOTE — PROGRESS NOTES
"Chief Complaint  Postpartum Care-6 wk pp exam    Subjective        Jannette Moy presents to [facility]  History of Present Illness  Patient is 6 weeks status post primary  at 39 weeks due to breech presentation.  The remainder of her pregnancy was largely uncomplicated.  She is doing well and has no complaints.  She is no longer using any pain medication.  She is breast-feeding.  She denies any concerns for postpartum depression or anxiety.  Objective   Vital Signs:  /76   Pulse 65   Ht 157.5 cm (62\")   Wt 86.3 kg (190 lb 3.2 oz)   BMI 34.79 kg/m²   Estimated body mass index is 34.79 kg/m² as calculated from the following:    Height as of this encounter: 157.5 cm (62\").    Weight as of this encounter: 86.3 kg (190 lb 3.2 oz).          Physical Exam  Vitals reviewed. Exam conducted with a chaperone present.   Constitutional:       Appearance: She is well-developed.   Cardiovascular:      Rate and Rhythm: Normal rate and regular rhythm.   Pulmonary:      Effort: Pulmonary effort is normal.      Breath sounds: Normal breath sounds.   Chest:   Breasts:     Right: No inverted nipple, mass, nipple discharge, skin change or tenderness.      Left: No inverted nipple, mass, nipple discharge, skin change or tenderness.   Abdominal:      General: There is no distension.      Palpations: Abdomen is soft.      Tenderness: There is no abdominal tenderness. There is no guarding or rebound.      Comments: Incision intact with no redness, drainage, or tenderness     Genitourinary:     Labia:         Right: No rash, tenderness, lesion or injury.         Left: No rash, tenderness, lesion or injury.       Vagina: Normal.      Cervix: Normal.      Uterus: Normal.       Adnexa:         Right: No mass, tenderness or fullness.          Left: No mass, tenderness or fullness.     Neurological:      Mental Status: She is alert.   Psychiatric:         Mood and Affect: Mood normal.         " Behavior: Behavior normal.        Result Review :                Assessment and Plan   Diagnoses and all orders for this visit:    1. Routine postpartum follow-up (Primary)    She is doing well postpartum and may resume all normal activities with no restrictions.  She currently declines any contraception and states that her  is considering a vasectomy.  She may follow-up in 1 year or sooner if needed.         Follow Up   Return in about 1 year (around 7/2/2026) for gynecological exam.  Patient was given instructions and counseling regarding her condition or for health maintenance advice. Please see specific information pulled into the AVS if appropriate.

## (undated) DEVICE — SUT VIC 0 CT 36IN J958H

## (undated) DEVICE — SLV SCD CALF HEMOFORCE DVT THERP REPROC MD

## (undated) DEVICE — SUT MNCRYL PLS ANTIB UD 4/0 PS2 18IN

## (undated) DEVICE — ANTIBACTERIAL UNDYED BRAIDED (POLYGLACTIN 910), SYNTHETIC ABSORBABLE SUTURE: Brand: COATED VICRYL

## (undated) DEVICE — EXOFIN PRECISION PEN HIGH VISCOSITY TOPICAL SKIN ADHESIVE: Brand: EXOFIN PRECISION PEN, 1G

## (undated) DEVICE — GLV SURG BIOGEL LTX PF 6

## (undated) DEVICE — GLV SURG BIOGEL LTX PF 6 1/2

## (undated) DEVICE — SOL IRR H2O BO 1000ML STRL